# Patient Record
Sex: FEMALE | Race: WHITE
[De-identification: names, ages, dates, MRNs, and addresses within clinical notes are randomized per-mention and may not be internally consistent; named-entity substitution may affect disease eponyms.]

---

## 2018-01-06 ENCOUNTER — HOSPITAL ENCOUNTER (INPATIENT)
Dept: HOSPITAL 96 - M.ERS | Age: 83
LOS: 1 days | Discharge: HOME | DRG: 291 | End: 2018-01-07
Attending: INTERNAL MEDICINE | Admitting: INTERNAL MEDICINE
Payer: MEDICARE

## 2018-01-06 VITALS — SYSTOLIC BLOOD PRESSURE: 169 MMHG | DIASTOLIC BLOOD PRESSURE: 74 MMHG

## 2018-01-06 VITALS — DIASTOLIC BLOOD PRESSURE: 40 MMHG | SYSTOLIC BLOOD PRESSURE: 107 MMHG

## 2018-01-06 VITALS — WEIGHT: 138 LBS | BODY MASS INDEX: 25.08 KG/M2 | HEIGHT: 62.01 IN

## 2018-01-06 VITALS — SYSTOLIC BLOOD PRESSURE: 139 MMHG | DIASTOLIC BLOOD PRESSURE: 58 MMHG

## 2018-01-06 VITALS — DIASTOLIC BLOOD PRESSURE: 65 MMHG | SYSTOLIC BLOOD PRESSURE: 155 MMHG

## 2018-01-06 VITALS — SYSTOLIC BLOOD PRESSURE: 183 MMHG | DIASTOLIC BLOOD PRESSURE: 82 MMHG

## 2018-01-06 VITALS — SYSTOLIC BLOOD PRESSURE: 139 MMHG | DIASTOLIC BLOOD PRESSURE: 44 MMHG

## 2018-01-06 DIAGNOSIS — I42.9: ICD-10-CM

## 2018-01-06 DIAGNOSIS — Z95.0: ICD-10-CM

## 2018-01-06 DIAGNOSIS — N18.3: ICD-10-CM

## 2018-01-06 DIAGNOSIS — J96.20: ICD-10-CM

## 2018-01-06 DIAGNOSIS — I25.2: ICD-10-CM

## 2018-01-06 DIAGNOSIS — Z79.01: ICD-10-CM

## 2018-01-06 DIAGNOSIS — Z90.49: ICD-10-CM

## 2018-01-06 DIAGNOSIS — I50.31: Primary | ICD-10-CM

## 2018-01-06 DIAGNOSIS — I48.91: ICD-10-CM

## 2018-01-06 DIAGNOSIS — Z82.49: ICD-10-CM

## 2018-01-06 DIAGNOSIS — E78.5: ICD-10-CM

## 2018-01-06 DIAGNOSIS — Z79.899: ICD-10-CM

## 2018-01-06 DIAGNOSIS — I12.9: ICD-10-CM

## 2018-01-06 DIAGNOSIS — E03.9: ICD-10-CM

## 2018-01-06 LAB
ABSOLUTE BASOPHILS: 0.1 THOU/UL (ref 0–0.2)
ABSOLUTE EOSINOPHILS: 0.6 THOU/UL (ref 0–0.7)
ABSOLUTE MONOCYTES: 0.7 THOU/UL (ref 0–1.2)
ALBUMIN SERPL-MCNC: 3.6 G/DL (ref 3.4–5)
ALP SERPL-CCNC: 68 U/L (ref 46–116)
ALT SERPL-CCNC: 32 U/L (ref 30–65)
ANION GAP SERPL CALC-SCNC: 8 MMOL/L (ref 7–16)
AST SERPL-CCNC: 27 U/L (ref 15–37)
BASOPHILS NFR BLD AUTO: 1 %
BILIRUB SERPL-MCNC: 0.5 MG/DL
BUN SERPL-MCNC: 18 MG/DL (ref 7–18)
CALCIUM SERPL-MCNC: 8.9 MG/DL (ref 8.5–10.1)
CHLORIDE SERPL-SCNC: 104 MMOL/L (ref 98–107)
CHOLEST SERPL-MCNC: 141 MG/DL (ref ?–200)
CO2 SERPL-SCNC: 28 MMOL/L (ref 21–32)
CREAT SERPL-MCNC: 1.2 MG/DL (ref 0.6–1.3)
EOSINOPHIL NFR BLD: 6.5 %
GLUCOSE SERPL-MCNC: 151 MG/DL (ref 70–99)
GRANULOCYTES NFR BLD MANUAL: 58.8 %
HCT VFR BLD CALC: 35.1 % (ref 37–47)
HDLC SERPL-MCNC: 76 MG/DL (ref 40–?)
HGB BLD-MCNC: 11.7 GM/DL (ref 12–15)
INR PPP: 1.2
LDLC SERPL-MCNC: 55 MG/DL (ref ?–100)
LIPASE: 229 U/L (ref 73–393)
LYMPHOCYTES # BLD: 2.3 THOU/UL (ref 0.8–5.3)
LYMPHOCYTES NFR BLD AUTO: 25.7 %
MCH RBC QN AUTO: 30 PG (ref 26–34)
MCHC RBC AUTO-ENTMCNC: 33.4 G/DL (ref 28–37)
MCV RBC: 89.8 FL (ref 80–100)
MONOCYTES NFR BLD: 8 %
MPV: 9.8 FL. (ref 7.2–11.1)
NEUTROPHILS # BLD: 5.3 THOU/UL (ref 1.6–8.1)
NT-PRO BRAIN NAT PEPTIDE: 7870 PG/ML (ref ?–300)
NUCLEATED RBCS: 0 /100WBC
PLATELET COUNT*: 171 THOU/UL (ref 150–400)
POTASSIUM SERPL-SCNC: 4.4 MMOL/L (ref 3.5–5.1)
PROT SERPL-MCNC: 7 G/DL (ref 6.4–8.2)
PROTHROMBIN TIME: 11.4 SECONDS (ref 9.2–11.5)
RBC # BLD AUTO: 3.91 MIL/UL (ref 4.2–5)
RDW-CV: 15.3 % (ref 10.5–14.5)
SODIUM SERPL-SCNC: 140 MMOL/L (ref 136–145)
TC:HDL: 1.9 RATIO
TRIGL SERPL-MCNC: 54 MG/DL (ref ?–150)
TROPONIN-I LEVEL: <0.06 NG/ML (ref ?–0.06)
VLDLC SERPL CALC-MCNC: 11 MG/DL (ref ?–40)
WBC # BLD AUTO: 9 THOU/UL (ref 4–11)

## 2018-01-06 NOTE — EKG
[place] [address]
Phone:  [phone]                     ELECTROCARDIOGRAM REPORT      
_______________________________________________________________________________
 
Name:       MIKAELA CHAN              Room:           [address]    ADM IN  
M.R.#:  [id number]      Account #:      [id number]  
Admission:  18     Attend Phys:    GABRIELLE Glover
Discharge:               Date of Birth:  [birth year]  
         Report #: [id number]
    [id number]
_______________________________________________________________________________
THIS REPORT FOR:  //name//                      
 
                         [facility] ED
                                       
Test Date:    2018               Test Time:    02:31:45
Pat Name:     MIKAELA CHAN          Department:   
Patient ID:   [id number]            Room:         [facility]
Gender:       F                        Technician:   TO
:          1934               Requested By: Soy Snow
Order Number: [id number] MD:   Tito Patterson
                                 Measurements
Intervals                              Axis          
Rate:         70                       P:            0
UT:           53                       QRS:          27
QRSD:         76                       T:            -85
QT:           395                                    
QTc:          427                                    
                           Interpretive Statements
Ventricular-paced complexes
No further rhythm analysis attempted due to paced rhythm
Borderline low voltage, extremity leads
Repol abnrm suggests ischemia, diffuse leads
Compared to ECG 2017 01:44:57
Early repolarization now present
Possible ischemia now present
 
Electronically Signed On 2018 14:38:31 CST by Tito Patterson
[email or web address]
 
 
 
 
 
 
 
 
 
 
 
 
 
 
 
  <ELECTRONICALLY SIGNED>
                                           By: Tito Patterson MD, FACC   
  18     1438
D: 18 0231   _____________________________________
T: 18 023   Tito Patterson MD, FACC     /EPI

## 2018-01-07 VITALS — DIASTOLIC BLOOD PRESSURE: 68 MMHG | SYSTOLIC BLOOD PRESSURE: 131 MMHG

## 2018-01-07 VITALS — DIASTOLIC BLOOD PRESSURE: 49 MMHG | SYSTOLIC BLOOD PRESSURE: 112 MMHG

## 2018-01-07 VITALS — SYSTOLIC BLOOD PRESSURE: 115 MMHG | DIASTOLIC BLOOD PRESSURE: 50 MMHG

## 2018-01-07 VITALS — SYSTOLIC BLOOD PRESSURE: 131 MMHG | DIASTOLIC BLOOD PRESSURE: 68 MMHG

## 2018-01-07 LAB
ABSOLUTE BASOPHILS: 0.1 THOU/UL (ref 0–0.2)
ABSOLUTE EOSINOPHILS: 0.4 THOU/UL (ref 0–0.7)
ABSOLUTE MONOCYTES: 0.5 THOU/UL (ref 0–1.2)
ANION GAP SERPL CALC-SCNC: 5 MMOL/L (ref 7–16)
BASOPHILS NFR BLD AUTO: 0.9 %
BUN SERPL-MCNC: 21 MG/DL (ref 7–18)
CALCIUM SERPL-MCNC: 8.6 MG/DL (ref 8.5–10.1)
CHLORIDE SERPL-SCNC: 100 MMOL/L (ref 98–107)
CO2 SERPL-SCNC: 34 MMOL/L (ref 21–32)
CREAT SERPL-MCNC: 1.4 MG/DL (ref 0.6–1.3)
EOSINOPHIL NFR BLD: 7.6 %
GLUCOSE SERPL-MCNC: 94 MG/DL (ref 70–99)
GRANULOCYTES NFR BLD MANUAL: 40.3 %
HCT VFR BLD CALC: 30.3 % (ref 37–47)
HGB BLD-MCNC: 10.1 GM/DL (ref 12–15)
LYMPHOCYTES # BLD: 2.3 THOU/UL (ref 0.8–5.3)
LYMPHOCYTES NFR BLD AUTO: 41.8 %
MCH RBC QN AUTO: 29.6 PG (ref 26–34)
MCHC RBC AUTO-ENTMCNC: 33.2 G/DL (ref 28–37)
MCV RBC: 89 FL (ref 80–100)
MONOCYTES NFR BLD: 9.4 %
MPV: 9.5 FL. (ref 7.2–11.1)
NEUTROPHILS # BLD: 2.2 THOU/UL (ref 1.6–8.1)
NUCLEATED RBCS: 0 /100WBC
PLATELET COUNT*: 138 THOU/UL (ref 150–400)
POTASSIUM SERPL-SCNC: 3.1 MMOL/L (ref 3.5–5.1)
RBC # BLD AUTO: 3.41 MIL/UL (ref 4.2–5)
RDW-CV: 14.6 % (ref 10.5–14.5)
SODIUM SERPL-SCNC: 139 MMOL/L (ref 136–145)
WBC # BLD AUTO: 5.6 THOU/UL (ref 4–11)

## 2018-01-09 NOTE — CON
[address]  [address]                    CONSULTATION                  
_______________________________________________________________________________
 
Name:       MIKAELA CHAN              Room:           [address] [place]  
M.R.#:  [id number]      Account #:      [id number]  
Admission:  01/06/18     Attend Phys:    GABRIELLE Glover
Discharge:  01/07/18     Date of Birth:  [birth year]  
         Report #: [id number]
                                                                     [id number]  
_______________________________________________________________________________
THIS REPORT FOR:  //name//                      
 
CC: Myron Joyner
 
INPATIENT CONSULTATION
 
PRIMARY CARDIOLOGIST:  Mj Triplett MD
 
REQUESTING PHYSICIAN FOR CONSULTATION:  Dr. Lachman.
 
PRIMARY CARE PHYSICIAN:  Myron Rodriguez DO
 
REASON FOR CONSULTATION:  Shortness of breath.
 
HISTORY OF PRESENT ILLNESS:  The patient is an 83-year-old woman with a history
of nonischemic cardiomyopathy, Takotsubo etiology and mild to moderate aortic
insufficiency presenting with increasing shortness of breath.  Chest x-ray was
unremarkable but her symptoms did improve with Lasix.
 
She had an episode of chest discomfort.  Her ECG was nondiagnostic.  She has a
history of a permanent pacemaker.  She was admitted for observation and her
troponin levels are normal and she is asymptomatic for shortness of breath after
receiving IV Lasix overnight.
 
She denies syncope or presyncope.
 
Over the summer, she did notice leg swelling but really had only been shortness
of breath recently.
 
She denies noncompliance with other medical therapies for her cardiomyopathy.
 
PAST MEDICAL HISTORY:  She had a cardiac catheterization in November of last
year which demonstrated nonischemic Takotsubo cardiomyopathy.  She had an AV
block permanent pacemaker.  She has mild to moderate aortic insufficiency,
hypertension, and the aforementioned hyperlipidemia.
 
SOCIAL HISTORY:  She is a nonsmoker.
 
FAMILY HISTORY:  Noncontributory.
 
HOME MEDICATIONS:  Atorvastatin 40 mg daily, Celexa daily, digoxin 125 mcg
daily, diltiazem 180 mg daily, Toprol- mg daily, and Xarelto 15 mg daily.
 
REVIEW OF SYSTEMS:
GASTROINTESTINAL:  No nausea, vomiting.
 
 
 
[place] [address]                    CONSULTATION                  
_______________________________________________________________________________
 
Name:       MIKAELA CHAN              Room:           [address]#:  [id number]      Account #:      [id number]  
Admission:  01/06/18     Attend Phys:    GABRIELLE Glover
Discharge:  01/07/18     Date of Birth:  [birth year]  
         Report #: [id number]
                                                                     [id number]  
_______________________________________________________________________________
GENITOURINARY:  No dysuria or hematuria.
PAD:  Denies any history of cardiovascular, strokes, or claudication.
NEUROLOGIC:  Denies headaches or seizures.  She has a remote history of syncope,
but denies syncope with this episode.
GENERAL:  No fevers or chills.
ALLERGIES:  Denies any aspirin or contrast allergies.
PSYCHIATRIC:  No depression or anxiety.
SKIN:  No rashes.
EYES:  she does use glasses.
CARDIOVASCULAR:  As above, positive chest discomfort, positive for shortness of
breath, positive edema intermittent.
 
PHYSICAL EXAMINATION:
VITAL SIGNS:  Blood pressure this morning is 155/65, pulse is 68, weight is 62.7
kilos.
GENERAL:  This is a pleasant elderly female.  She is alert, oriented, in no
apparent distress.
NECK:  Supple.  No jugular venous distention.
CARDIOVASCULAR:  Regular.  There is faint diastolic murmur.
LUNGS:  Clear to auscultation.
ABDOMEN:  Nontender.
EXTREMITIES:  There is no peripheral edema.
 
IMAGING DATA:  Chest x-ray reveals cardiomegaly, no significant infiltrates.
 
LABORATORY DATA:  Cardiac troponin level was 0.06 x 2 sets.
 
IMPRESSION AND PLAN:
1.  Atrial fibrillation.  This is not really an issue.  She presents for stable
heart racing, we will continue with anticoagulation.
2.  Acute diastolic congestive heart failure.  I think she should be on a mild
dose of diuretics.  I will prescribe her Lasix 20 mg with 10 of potassium to be
taken 3 days per week.
3.  Nonischemic cardiomyopathy.  She will continue with aggressive medical
therapy.
4.  Chest pain.  I think this is noncardiac related to ischemia maybe related to
congestive heart failure.
5.  It is okay for her to continue with present medical therapy with addition of
intermittent Lasix.
 
Thank you for allowing me to participate in her care.
 
 
 
<ELECTRONICALLY SIGNED>
                                        By:  Tito Patterson MD, FACC   
01/09/18     1012
D: 01/06/18 1333_______________________________________
T: 01/06/18 2244Tito Patterson MD, FACC      /nt

## 2018-01-17 ENCOUNTER — HOSPITAL ENCOUNTER (INPATIENT)
Dept: HOSPITAL 96 - M.ERS | Age: 83
LOS: 1 days | Discharge: HOME | DRG: 291 | End: 2018-01-18
Attending: INTERNAL MEDICINE | Admitting: INTERNAL MEDICINE
Payer: MEDICARE

## 2018-01-17 VITALS — DIASTOLIC BLOOD PRESSURE: 100 MMHG | SYSTOLIC BLOOD PRESSURE: 187 MMHG

## 2018-01-17 VITALS — DIASTOLIC BLOOD PRESSURE: 78 MMHG | SYSTOLIC BLOOD PRESSURE: 146 MMHG

## 2018-01-17 VITALS — HEIGHT: 62.01 IN | BODY MASS INDEX: 24.35 KG/M2 | WEIGHT: 134 LBS

## 2018-01-17 VITALS — DIASTOLIC BLOOD PRESSURE: 73 MMHG | SYSTOLIC BLOOD PRESSURE: 154 MMHG

## 2018-01-17 VITALS — DIASTOLIC BLOOD PRESSURE: 92 MMHG | SYSTOLIC BLOOD PRESSURE: 200 MMHG

## 2018-01-17 DIAGNOSIS — Z90.49: ICD-10-CM

## 2018-01-17 DIAGNOSIS — E03.9: ICD-10-CM

## 2018-01-17 DIAGNOSIS — Z88.8: ICD-10-CM

## 2018-01-17 DIAGNOSIS — E87.1: ICD-10-CM

## 2018-01-17 DIAGNOSIS — I13.0: Primary | ICD-10-CM

## 2018-01-17 DIAGNOSIS — Z82.49: ICD-10-CM

## 2018-01-17 DIAGNOSIS — Z95.0: ICD-10-CM

## 2018-01-17 DIAGNOSIS — I48.91: ICD-10-CM

## 2018-01-17 DIAGNOSIS — I50.33: ICD-10-CM

## 2018-01-17 DIAGNOSIS — N18.3: ICD-10-CM

## 2018-01-17 DIAGNOSIS — Z79.01: ICD-10-CM

## 2018-01-17 DIAGNOSIS — J90: ICD-10-CM

## 2018-01-17 DIAGNOSIS — Z79.899: ICD-10-CM

## 2018-01-17 LAB
ABSOLUTE BASOPHILS: 0.2 THOU/UL (ref 0–0.2)
ABSOLUTE EOSINOPHILS: 0.4 THOU/UL (ref 0–0.7)
ABSOLUTE MONOCYTES: 0.8 THOU/UL (ref 0–1.2)
ALBUMIN SERPL-MCNC: 3.6 G/DL (ref 3.4–5)
ALP SERPL-CCNC: 71 U/L (ref 46–116)
ALT SERPL-CCNC: 36 U/L (ref 30–65)
ANION GAP SERPL CALC-SCNC: 5 MMOL/L (ref 7–16)
APTT BLD: 24.5 SECONDS (ref 25–31.3)
AST SERPL-CCNC: 36 U/L (ref 15–37)
BASOPHILS NFR BLD AUTO: 2.5 %
BILIRUB SERPL-MCNC: 1.1 MG/DL
BUN SERPL-MCNC: 16 MG/DL (ref 7–18)
CALCIUM SERPL-MCNC: 9.3 MG/DL (ref 8.5–10.1)
CHLORIDE SERPL-SCNC: 98 MMOL/L (ref 98–107)
CK-MB MASS: 0.7 NG/ML
CO2 SERPL-SCNC: 31 MMOL/L (ref 21–32)
CREAT SERPL-MCNC: 1.1 MG/DL (ref 0.6–1.3)
EOSINOPHIL NFR BLD: 4.8 %
GLUCOSE SERPL-MCNC: 118 MG/DL (ref 70–99)
GRANULOCYTES NFR BLD MANUAL: 60.3 %
HCT VFR BLD CALC: 38.3 % (ref 37–47)
HGB BLD-MCNC: 12.7 GM/DL (ref 12–15)
INR PPP: 1.1
LIPASE: 208 U/L (ref 73–393)
LYMPHOCYTES # BLD: 1.6 THOU/UL (ref 0.8–5.3)
LYMPHOCYTES NFR BLD AUTO: 21.2 %
MAGNESIUM SERPL-MCNC: 1.9 MG/DL (ref 1.8–2.4)
MCH RBC QN AUTO: 29.4 PG (ref 26–34)
MCHC RBC AUTO-ENTMCNC: 33.1 G/DL (ref 28–37)
MCV RBC: 88.8 FL (ref 80–100)
MONOCYTES NFR BLD: 11.2 %
MPV: 9.4 FL. (ref 7.2–11.1)
NEUTROPHILS # BLD: 4.5 THOU/UL (ref 1.6–8.1)
NT-PRO BRAIN NAT PEPTIDE: 6146 PG/ML (ref ?–300)
NUCLEATED RBCS: 0 /100WBC
PLATELET COUNT*: 195 THOU/UL (ref 150–400)
POTASSIUM SERPL-SCNC: 4.7 MMOL/L (ref 3.5–5.1)
PROT SERPL-MCNC: 7.4 G/DL (ref 6.4–8.2)
PROTHROMBIN TIME: 10.9 SECONDS (ref 9.2–11.5)
RBC # BLD AUTO: 4.31 MIL/UL (ref 4.2–5)
RDW-CV: 14.8 % (ref 10.5–14.5)
SODIUM SERPL-SCNC: 134 MMOL/L (ref 136–145)
TROPONIN-I LEVEL: <0.06 NG/ML (ref ?–0.06)
WBC # BLD AUTO: 7.4 THOU/UL (ref 4–11)

## 2018-01-17 NOTE — EKG
[place] [address]
Phone:  [phone]                     ELECTROCARDIOGRAM REPORT      
_______________________________________________________________________________
 
Name:       MIKAELA CHAN              Room:           [address]    ADM IN  
M.R.#:  [id number]      Account #:      [id number]  
Admission:  18     Attend Phys:    Bentley Mims MD 
Discharge:               Date of Birth:  [birth year]  
         Report #: [id number]
    [id number]
_______________________________________________________________________________
THIS REPORT FOR:  //name//                      
 
                         [facility] ED
                                       
Test Date:    2018               Test Time:    09:52:04
Pat Name:     MIKAELA CHAN          Department:   
Patient ID:   [id number]            Room:         [facility]
Gender:       F                        Technician:   SASHA DOLL
:          1934               Requested By: Aaron Blackmon
Order Number: [id number] MD:   Mj Triplett
                                 Measurements
Intervals                              Axis          
Rate:         122                      P:            
DE:                                    QRS:          -6
QRSD:         74                       T:            220
QT:           327                                    
QTc:          466                                    
                           Interpretive Statements
Atrial fibrillation
Inferior infarct, age indeterminate
Abnrm T, consider ischemia, anterolateral lds
Compared to ECG 2018 02:31:45
Ventricular premature complex(es) now present
Myocardial infarct finding now present
Early repolarization no longer present
Possible ischemia still present
 
Electronically Signed On 2018 17:41:07 CST by Mj Triplett
[email or web address]
 
 
 
 
 
 
 
 
 
 
 
 
 
 
  <ELECTRONICALLY SIGNED>
                                           By: Mj Triplett MD, FACC   
  18     1741
D: 18 0952   _____________________________________
T: 18 0952   Mj Triplett MD, FACC     /EPI

## 2018-01-17 NOTE — NUR
PT A/OX4, AFIB ON THE MONITOR, RA, UP AD LUIS ALFREDO, FREE FROM SOA/PAIN,
MEDS/ASSESSMENT PER CHARTING, HOURLY ROUNDING IN PLACE, FALL PRECAUTIONS IN
PLACE, BED LOW LOCKED POSITION AND CALL LIGHT IN REACH, VSS, WILL CONT TO
MONITOR.

## 2018-01-17 NOTE — NUR
RECEIVED REPORT. PT TRANSFERRED TO ROOM 224 VIA CART. VSS. CARDIAC MONTIORING
IN PLACE AFIB. ADMISISON HISTORY AND ASSESSMENT COMPLETED AS CHARTED. PT ALERT
AND ORIENTED. PT ON RA HOWEVER PT IS SOB WITH ACTIVITY. 2L ON STAND BY. PT
DENIES ANY COMPLAINTS OF PAIN OR DISCOMFORT. PT IS UP WITH STAND BY
ASSISTANCE. PT ORIENTED TO ROOM AND CALL LIGHT. PT COMMUNICATES UNDERSTANDING.
PT'S BELONINGS IN CARDIAC REHAB CURRENTLY. CALLED AND THEY STATED THEY WOULD
BRING UP HER BELONGINGS TO HER ROOM. PT'S BP ELEVATED-CARDIOLOGY CONSULTED.
PT'S METOPROLOL GIVEN. CALL LIGHT IS WITHIN REACH. WILL CONTINUE TO MONITOR
FOR DURAITON OF SHIFT.

## 2018-01-18 VITALS — DIASTOLIC BLOOD PRESSURE: 59 MMHG | SYSTOLIC BLOOD PRESSURE: 143 MMHG

## 2018-01-18 VITALS — DIASTOLIC BLOOD PRESSURE: 53 MMHG | SYSTOLIC BLOOD PRESSURE: 128 MMHG

## 2018-01-18 VITALS — SYSTOLIC BLOOD PRESSURE: 150 MMHG | DIASTOLIC BLOOD PRESSURE: 73 MMHG

## 2018-01-18 VITALS — DIASTOLIC BLOOD PRESSURE: 73 MMHG | SYSTOLIC BLOOD PRESSURE: 150 MMHG

## 2018-01-18 NOTE — NUR
RECEVIED REPORT. ASSUMED CARE OF PT AT 0730. VSS. CARDIAC MOTNIORING IN PLACE
A-PACED WITH UNDERLYING AFIB. AM ASSESSMENT AND VITALS COMPLETED AS CHARTED.
PT ALERT AND ORIENTED. PT ON RA. PT UP WITH SBA TO BATHROOM THIS AM. PT HAS
NOTED EXERTIONAL DYSPNEA. PT'S O2 SAT 96% ON RA. PT ALERT AND OREINTED. IV
SALINE LOCKED. PT DENIES ANY COMPLAINTS OF PAIN OR DISCOMFORT THIS AM. PT
INFORMED OF PLAN OF CARE. PT COMMUNICATES UNDERSTANDING. CALL LIGHT IS WITHIN
REACH. WILL CONTINUE TO MOTNIOR FOR DURAITON OF SHIFT.

## 2018-01-18 NOTE — NUR
DISCAHRGE ORDERS RECEIVED AND PREPARED. IV AND CARDIAC MONITORING
DISCONTINUED. PT EDUCATED ON DISCHARGE INSTRCUTIONS. PT COMMUNICATES
UNDERSTANDING. PT'S SCRIPT SENT TO PT'S PHARMACY. PT GIVEN COPY OF DISCHARGE
PAPERWORK. PT ESCORTED OFF UNIT VIA W/C. PT LEFT IN PRIVATE VEHICLE.

## 2018-01-18 NOTE — EKG
[place] [address]
Phone:  [phone]                     ELECTROCARDIOGRAM REPORT      
_______________________________________________________________________________
 
Name:       MIKAELA CHAN              Room:           [address]    DIS IN  
M.R.#:  [id number]      Account #:      [id number]  
Admission:  18     Attend Phys:    Bentley Mims MD 
Discharge:  18     Date of Birth:  [birth year]  
         Report #: [id number]
    [id number]
_______________________________________________________________________________
THIS REPORT FOR:  //name//                      
 
                          [facility]
                                       
Test Date:    2018               Test Time:    09:12:36
Pat Name:     MIKAELA CHAN          Department:   
Patient ID:   [id number]            Room:         [address]
Gender:       F                        Technician:   27
:          1934               Requested By: Mj Triplett
Order Number: [id number]    Jacob MD:   John Sorto
                                 Measurements
Intervals                              Axis          
Rate:         104                      P:            
[place]:                                    QRS:          7
QRSD:         74                       T:            244
QT:           336                                    
QTc:          442                                    
                           Interpretive Statements
Afib/flut and V-paced complexes
No further rhythm analysis attempted due to paced rhythm
Repol abnrm, possible ischemia
Compared to ECG 2018 02:31:45
No significant changes
 
Electronically Signed On 2018 15:43:41 CST by John Sorto
[email or web address]
 
 
 
 
 
 
 
 
 
 
 
 
 
 
 
 
 
  <ELECTRONICALLY SIGNED>
                                           By: John Sorto MD, [facility]     
  18     1543
D: 18 0912   _____________________________________
T: 18 0912   John Sorto MD, [facility]       /EPI

## 2018-02-03 ENCOUNTER — HOSPITAL ENCOUNTER (INPATIENT)
Dept: HOSPITAL 96 - M.ERS | Age: 83
LOS: 5 days | Discharge: NURSING, REHAB OR LONG-TERM CARE | DRG: 91 | End: 2018-02-08
Attending: FAMILY MEDICINE | Admitting: FAMILY MEDICINE
Payer: MEDICARE

## 2018-02-03 VITALS — SYSTOLIC BLOOD PRESSURE: 132 MMHG | DIASTOLIC BLOOD PRESSURE: 61 MMHG

## 2018-02-03 VITALS — WEIGHT: 130 LBS | HEIGHT: 65 IN | BODY MASS INDEX: 21.66 KG/M2

## 2018-02-03 VITALS — SYSTOLIC BLOOD PRESSURE: 147 MMHG | DIASTOLIC BLOOD PRESSURE: 84 MMHG

## 2018-02-03 VITALS — SYSTOLIC BLOOD PRESSURE: 158 MMHG | DIASTOLIC BLOOD PRESSURE: 80 MMHG

## 2018-02-03 DIAGNOSIS — G92: Primary | ICD-10-CM

## 2018-02-03 DIAGNOSIS — Z79.899: ICD-10-CM

## 2018-02-03 DIAGNOSIS — Z90.49: ICD-10-CM

## 2018-02-03 DIAGNOSIS — I50.33: ICD-10-CM

## 2018-02-03 DIAGNOSIS — E03.9: ICD-10-CM

## 2018-02-03 DIAGNOSIS — I50.9: ICD-10-CM

## 2018-02-03 DIAGNOSIS — Z88.8: ICD-10-CM

## 2018-02-03 DIAGNOSIS — F03.90: ICD-10-CM

## 2018-02-03 DIAGNOSIS — R65.10: ICD-10-CM

## 2018-02-03 DIAGNOSIS — Z95.0: ICD-10-CM

## 2018-02-03 DIAGNOSIS — I48.91: ICD-10-CM

## 2018-02-03 DIAGNOSIS — I11.0: ICD-10-CM

## 2018-02-03 DIAGNOSIS — N39.0: ICD-10-CM

## 2018-02-03 LAB
ABSOLUTE BASOPHILS: 0.1 THOU/UL (ref 0–0.2)
ABSOLUTE EOSINOPHILS: 0.2 THOU/UL (ref 0–0.7)
ABSOLUTE MONOCYTES: 0.6 THOU/UL (ref 0–1.2)
ALBUMIN SERPL-MCNC: 3.6 G/DL (ref 3.4–5)
ALP SERPL-CCNC: 60 U/L (ref 46–116)
ALT SERPL-CCNC: 26 U/L (ref 30–65)
ANION GAP SERPL CALC-SCNC: 8 MMOL/L (ref 7–16)
APTT BLD: 27.1 SECONDS (ref 25–31.3)
AST SERPL-CCNC: 24 U/L (ref 15–37)
BASOPHILS NFR BLD AUTO: 1.2 %
BE: -0.4 MMOL/L
BILIRUB SERPL-MCNC: 0.8 MG/DL
BILIRUB UR-MCNC: NEGATIVE MG/DL
BUN SERPL-MCNC: 20 MG/DL (ref 7–18)
CALCIUM SERPL-MCNC: 9.2 MG/DL (ref 8.5–10.1)
CHLORIDE SERPL-SCNC: 101 MMOL/L (ref 98–107)
CO2 SERPL-SCNC: 29 MMOL/L (ref 21–32)
COLOR UR: YELLOW
CREAT SERPL-MCNC: 1.3 MG/DL (ref 0.6–1.3)
EOSINOPHIL NFR BLD: 2.7 %
GLUCOSE SERPL-MCNC: 133 MG/DL (ref 70–99)
GRANULOCYTES NFR BLD MANUAL: 67.3 %
HCO3 BLD-SCNC: 23.4 MMOL/L (ref 22–26)
HCT VFR BLD CALC: 36.6 % (ref 37–47)
HGB BLD-MCNC: 12.1 GM/DL (ref 12–15)
INR PPP: 1.2
KETONES UR STRIP-MCNC: (no result) MG/DL
LYMPHOCYTES # BLD: 1.4 THOU/UL (ref 0.8–5.3)
LYMPHOCYTES NFR BLD AUTO: 20.3 %
MCH RBC QN AUTO: 29.2 PG (ref 26–34)
MCHC RBC AUTO-ENTMCNC: 33 G/DL (ref 28–37)
MCV RBC: 88.6 FL (ref 80–100)
MONOCYTES NFR BLD: 8.5 %
MPV: 9.3 FL. (ref 7.2–11.1)
NEUTROPHILS # BLD: 4.6 THOU/UL (ref 1.6–8.1)
NUCLEATED RBCS: 0 /100WBC
PCO2 BLD: 35.5 MMHG (ref 35–45)
PLATELET COUNT*: 187 THOU/UL (ref 150–400)
PO2 BLD: 78.4 MMHG (ref 75–100)
POTASSIUM SERPL-SCNC: 4.4 MMOL/L (ref 3.5–5.1)
PROT SERPL-MCNC: 7 G/DL (ref 6.4–8.2)
PROT UR QL STRIP: (no result)
PROTHROMBIN TIME: 11.6 SECONDS (ref 9.2–11.5)
RBC # BLD AUTO: 4.13 MIL/UL (ref 4.2–5)
RBC # UR STRIP: (no result) /UL
RDW-CV: 14.4 % (ref 10.5–14.5)
SODIUM SERPL-SCNC: 138 MMOL/L (ref 136–145)
SP GR UR STRIP: 1.02 (ref 1–1.03)
SQUAMOUS: (no result) /LPF (ref 0–3)
TROPONIN-I LEVEL: <0.06 NG/ML (ref ?–0.06)
URINE CLARITY: CLEAR
URINE GLUCOSE-RANDOM: NEGATIVE
URINE LEUKOCYTES-REFLEX: (no result)
URINE NITRITE-REFLEX: NEGATIVE
URINE WBC-REFLEX: (no result) /HPF (ref 0–5)
UROBILINOGEN UR STRIP-ACNC: 0.2 E.U./DL (ref 0.2–1)
WBC # BLD AUTO: 6.9 THOU/UL (ref 4–11)

## 2018-02-04 VITALS — DIASTOLIC BLOOD PRESSURE: 61 MMHG | SYSTOLIC BLOOD PRESSURE: 138 MMHG

## 2018-02-04 VITALS — DIASTOLIC BLOOD PRESSURE: 63 MMHG | SYSTOLIC BLOOD PRESSURE: 126 MMHG

## 2018-02-04 VITALS — SYSTOLIC BLOOD PRESSURE: 136 MMHG | DIASTOLIC BLOOD PRESSURE: 76 MMHG

## 2018-02-04 VITALS — DIASTOLIC BLOOD PRESSURE: 64 MMHG | SYSTOLIC BLOOD PRESSURE: 141 MMHG

## 2018-02-04 VITALS — SYSTOLIC BLOOD PRESSURE: 144 MMHG | DIASTOLIC BLOOD PRESSURE: 77 MMHG

## 2018-02-04 NOTE — EKG
[place] [address]
Phone:  [phone]                     ELECTROCARDIOGRAM REPORT      
_______________________________________________________________________________
 
Name:       MIKAELA CHAN              Room:           [address]    ADM IN  
M.R.#:  [id number]      Account #:      [id number]  
Admission:  18     Attend Phys:    Preston Treadwell, 
Discharge:               Date of Birth:  [birth year]  
         Report #: [id number]
    [id number]
_______________________________________________________________________________
THIS REPORT FOR:  //name//                      
 
                         [facility] ED
                                       
Test Date:    2018               Test Time:    21:03:07
Pat Name:     MIKAELA CHAN          Department:   
Patient ID:   [id number]            Room:         [facility]
Gender:       F                        Technician:   TV
:          1934               Requested By: Lisa Pack
Order Number: [id number] MD:   Tito Patterson
                                 Measurements
Intervals                              Axis          
Rate:         72                       P:            
DE:                                    QRS:          -2
QRSD:         84                       T:            -69
QT:           392                                    
QTc:          430                                    
                           Interpretive Statements
Afib/flut and V-paced complexes
No further rhythm analysis attempted due to paced rhythm
Probable LVH with secondary repol abnrm
Compared to ECG 2018 09:52:04
Myocardial infarct finding no longer present
Possible ischemia no longer present
 
Electronically Signed On 2018 11:49:29 CST by Tito Patterson
[email or web address]
 
 
 
 
 
 
 
 
 
 
 
 
 
 
 
 
  <ELECTRONICALLY SIGNED>
                                           By: Tito Patterson MD, FACC   
  18     1149
D: 18   _____________________________________
T: 18   Tito Patterson MD, FAC     /EPI

## 2018-02-04 NOTE — NUR
PT WAS HOPEFUL TO GO HOME TODAY,HER SON AND DIL HERE MOST OF THE DAY,
EXPRESSED CONCERN ABOUT PT GOING HOME AND BEING WEAK, THEY WANT PT TO HAVE
SOME THERAPY IS POSSIBLE. PT SPOKE TO DR DOWILNG AND IS WILLING TO STAY AND
HAVE MORE LABS DRAWN AND PT/OT TOMORROW. FALL PRECATUINS IN PLACE, PT UNSTEADY
ON FEET. VSS, AFIB/VPACED ON THE MONITOR. WILL CONTINUE TO MONITOR.

## 2018-02-04 NOTE — NUR
PATIENT ADMITTED TO THE FLOOR FOR SOA, DIZZINESS, AND NEAR SYCOPE. ALERT AND
ORIENTED X 4. CONT. A-FIB ON MONITOR. DENIES PAIN OR DISCOMFORT. NO SIGN OF
DISTRESS WILL PROCEED WITH CURRENT PLAN OF CARE IN PLACE.

## 2018-02-04 NOTE — NUR
ASSUMED PT CARE AT 0730. FULL ASSESMENT DONE AS CHARTED. PT A/O X4, DENIES
PAIN, VSS, AFIB/VPACED ON THE MONITOR. PT HOPEFUL TO GO HOME TODAY, PT
EDUCATED THAT THE NEUROLOGIST IS COMING TO SEE HER TODAY. PT AGREEABLE TO
THIS. PTS HOME MEDS RESTARTED, NS INFUSING TO LEFT AC AT 100MLS/HR. FALL
PRECATUIOINS IN PLACE, CALL LIGHT IN REACH. WILL CONTINUE WITH PLAN OF CARE.

## 2018-02-05 VITALS — DIASTOLIC BLOOD PRESSURE: 62 MMHG | SYSTOLIC BLOOD PRESSURE: 148 MMHG

## 2018-02-05 VITALS — DIASTOLIC BLOOD PRESSURE: 65 MMHG | SYSTOLIC BLOOD PRESSURE: 158 MMHG

## 2018-02-05 VITALS — SYSTOLIC BLOOD PRESSURE: 140 MMHG | DIASTOLIC BLOOD PRESSURE: 62 MMHG

## 2018-02-05 VITALS — SYSTOLIC BLOOD PRESSURE: 136 MMHG | DIASTOLIC BLOOD PRESSURE: 67 MMHG

## 2018-02-05 VITALS — SYSTOLIC BLOOD PRESSURE: 172 MMHG | DIASTOLIC BLOOD PRESSURE: 92 MMHG

## 2018-02-05 VITALS — DIASTOLIC BLOOD PRESSURE: 74 MMHG | SYSTOLIC BLOOD PRESSURE: 134 MMHG

## 2018-02-05 VITALS — DIASTOLIC BLOOD PRESSURE: 68 MMHG | SYSTOLIC BLOOD PRESSURE: 150 MMHG

## 2018-02-05 VITALS — SYSTOLIC BLOOD PRESSURE: 149 MMHG | DIASTOLIC BLOOD PRESSURE: 67 MMHG

## 2018-02-05 VITALS — DIASTOLIC BLOOD PRESSURE: 62 MMHG | SYSTOLIC BLOOD PRESSURE: 140 MMHG

## 2018-02-05 VITALS — DIASTOLIC BLOOD PRESSURE: 58 MMHG | SYSTOLIC BLOOD PRESSURE: 144 MMHG

## 2018-02-05 VITALS — DIASTOLIC BLOOD PRESSURE: 69 MMHG | SYSTOLIC BLOOD PRESSURE: 153 MMHG

## 2018-02-05 VITALS — DIASTOLIC BLOOD PRESSURE: 73 MMHG | SYSTOLIC BLOOD PRESSURE: 136 MMHG

## 2018-02-05 LAB
ANION GAP SERPL CALC-SCNC: 9 MMOL/L (ref 7–16)
BUN SERPL-MCNC: 18 MG/DL (ref 7–18)
CALCIUM SERPL-MCNC: 8.2 MG/DL (ref 8.5–10.1)
CHLORIDE SERPL-SCNC: 104 MMOL/L (ref 98–107)
CO2 SERPL-SCNC: 25 MMOL/L (ref 21–32)
CREAT SERPL-MCNC: 1.1 MG/DL (ref 0.6–1.3)
GLUCOSE SERPL-MCNC: 116 MG/DL (ref 70–99)
HCT VFR BLD CALC: 30.8 % (ref 37–47)
HGB BLD-MCNC: 10.5 GM/DL (ref 12–15)
MAGNESIUM SERPL-MCNC: 1.5 MG/DL (ref 1.8–2.4)
MCH RBC QN AUTO: 29.9 PG (ref 26–34)
MCHC RBC AUTO-ENTMCNC: 34.1 G/DL (ref 28–37)
MCV RBC: 87.5 FL (ref 80–100)
MPV: 9.5 FL. (ref 7.2–11.1)
PLATELET COUNT*: 159 THOU/UL (ref 150–400)
POTASSIUM SERPL-SCNC: 4 MMOL/L (ref 3.5–5.1)
RBC # BLD AUTO: 3.52 MIL/UL (ref 4.2–5)
RDW-CV: 14.5 % (ref 10.5–14.5)
SODIUM SERPL-SCNC: 138 MMOL/L (ref 136–145)
WBC # BLD AUTO: 6.3 THOU/UL (ref 4–11)

## 2018-02-05 NOTE — NUR
VSS, PT IS PROGROGRESSING TOWARDS GOAL, PT IS TRAACING V-PACED ON THE MONITOR
AND UP WITH ONE AND IS CONFUSED, AND O2 NEEDS ARE RA, PT DENIES ANY PAIN AND
HAS WORKED WITH PT/OT/SP,, PT HAS BEEN UP TO CHAIR FOR ALL MEALS AND DENIES
ANY PAIN AT THIS TIME HOURLY ROUNDS COMPLETED.

## 2018-02-05 NOTE — NUR
THIS NURSE ASSUMES CARE OF PT 02/04/18 AT 1930, PT IS ALERT AND ORIENTED X4,
PT RESTS QUIETLY WITH EYES CLOSED MOST OF THIS SHIFT, PT TRACING A FIB VPACED
ON CARDIAC MONITORING, PT PLACED ON 2L SUPPLEMENTAL O2 THIS AM FOR O2 SAT IN
LOW 90S AND INCREASED WORK OF BREATHING, PT UP TO BATHROOM WITH ASSIST
THROUGHOUT THE NIGHT

## 2018-02-05 NOTE — NUR
MET WITH PT AND THEN SPOKE WITH DTR/YAN ALONSO OVER THE PHONE.  ALSO MET
WITH SON/VERO IN WAITING ROOM.  PT LIVES ALONE.  KNOWN TO CM FROM ADMIT IN NOV
WHEN SHE WENT TO STAY WITH DIGNA/LIO IN [place] WITH [facility].  PT ADMITTED
WITH AMS.  PT WAS CONFUSED DURING CONVERSATION, UNABLE TO ANSWER ALL QUESTIONS
AND BECAME FRUSTRATED.  PER LIO, PT HAS DEMENTIA AND HAS BEEN LIVING ALONE
SINCE SHE LEFT HER HOUSE IN NOV.  PT HAD HH WHILE SHE WAS AT [facility] BUT
DISCONTINUED THEM BEFORE RETURNING HOME.  PT HAS BEEN DRIVING SOME.  LIO AND
AND PT'S SON/VERO CHECK ON PT REGULARLY/AT LEAST EVERY WEEKEND.  LIO DOES
SHOPPING, MAKES UP MEALS THAT PT CAN HEAT UP AND DOES CLEANING AND LAUNDRY.
VERO STATED THAT HE COMES UP WITH HIS WIFE FROM [place], [place] AND THEY
STAY WITH PT PERIODICALLY. HE AND HIS WIFE LIVED WITH PT FOR A YEAR A FEW
YEARS AGO.  HE REPORTS SEEING A 'STEADY DECLINE' IN HER MENTAL CAPACITY. BOTH
CHILDREN VOICED CONCERN ABOUT PT BEING ALONE BUT LIO DOESN'T WANT TO 'PUSH'
PT TO MOVE.  BOTH STATED THEY FEEL PT SHOULD BE IN AN ASSISTED LIVING
SITUATION AND ONE HAD BEEN ARRANGED ABOUT A YEAR AGO IN [place], BUT PT
DECLINED.  VERO STATED THAT PT 'PUT HERSELF' ON THE WAITING LIST AT THE
[facility] A YEAR AGO AND HE SPOKE WITH THEM TODAY AND THERE MAY BE AN APT IN
ASSISTED LIVING SOON.  AT THIS TIME, FAMILY IS WILLING FOR PT TO GO HOME WITH
24HR FAMILY CARE AND HH AND CONSIDER group home AFTER THAT.  SHE HAS HAD [facility]
IN PAST AND ARE INTERESTED IN IT.  VERO'S WIFE CAN STAY WITH PT FOR NOW.
CALLED AND FAXED INITIAL REFERRAL TO [facility].  WILL FOLLOW

## 2018-02-06 VITALS — DIASTOLIC BLOOD PRESSURE: 64 MMHG | SYSTOLIC BLOOD PRESSURE: 143 MMHG

## 2018-02-06 VITALS — SYSTOLIC BLOOD PRESSURE: 143 MMHG | DIASTOLIC BLOOD PRESSURE: 64 MMHG

## 2018-02-06 VITALS — SYSTOLIC BLOOD PRESSURE: 146 MMHG | DIASTOLIC BLOOD PRESSURE: 60 MMHG

## 2018-02-06 VITALS — DIASTOLIC BLOOD PRESSURE: 51 MMHG | SYSTOLIC BLOOD PRESSURE: 151 MMHG

## 2018-02-06 VITALS — DIASTOLIC BLOOD PRESSURE: 60 MMHG | SYSTOLIC BLOOD PRESSURE: 146 MMHG

## 2018-02-06 VITALS — DIASTOLIC BLOOD PRESSURE: 63 MMHG | SYSTOLIC BLOOD PRESSURE: 129 MMHG

## 2018-02-06 VITALS — SYSTOLIC BLOOD PRESSURE: 138 MMHG | DIASTOLIC BLOOD PRESSURE: 59 MMHG

## 2018-02-06 VITALS — DIASTOLIC BLOOD PRESSURE: 72 MMHG | SYSTOLIC BLOOD PRESSURE: 163 MMHG

## 2018-02-06 VITALS — DIASTOLIC BLOOD PRESSURE: 59 MMHG | SYSTOLIC BLOOD PRESSURE: 138 MMHG

## 2018-02-06 LAB
BILIRUB UR-MCNC: NEGATIVE MG/DL
COLOR UR: YELLOW
KETONES UR STRIP-MCNC: NEGATIVE MG/DL
PROT UR QL STRIP: NEGATIVE
RBC # UR STRIP: NEGATIVE /UL
SP GR UR STRIP: >= 1.03 (ref 1–1.03)
URINE CLARITY: CLEAR
URINE GLUCOSE-RANDOM: NEGATIVE
URINE LEUKOCYTES-REFLEX: NEGATIVE
URINE NITRITE-REFLEX: NEGATIVE
UROBILINOGEN UR STRIP-ACNC: 0.2 E.U./DL (ref 0.2–1)

## 2018-02-06 NOTE — NUR
ASSUMED PT CARE AT 1930, PT IS AWAKE AND ALERT TO SELF AND PLACE. PT IS
TRACING VPACED ONT HE MONITOR, ON RA SATTING MID TO HIGH 90'S. PT HAS HAD
EPISODES OF CONFUSION THROUGHOUT THE NIGHT. NEURO CHECKS COMPLETED AS
CHARTED. IVF INFUSING PER MAR. PT DENIES ANY PAIN OR NEEDS AT THIS TIME. PT
DOES NOT CALL OUT CORRECTLY, PT SET OFF BED ALARM, AND TRIED TO UNPLUG THE BED
FROM THE WALL TO STOP THE ALARM FROM GOING OFF, PT WAS GIVEN LOT'S OF VERBAL
EDUCATION ON NEED TO CALL OUT FOR NEEDS. BED IN LOW POSITION, CALL LIGHT IN
REACH, BED ALARM ON, YELLOW ARM BAND AND SOCKS IN PLACE. HOURLY ROUNDING
COMPLETED FOR PT SAFETY.

## 2018-02-06 NOTE — NUR
ASSUMED CARE OF PT THIS AM AROUND 0715- CARDIAC MONITOR IN PLACE AS ORDERED,
TRACING V-PACED- UPON ASSESSMENT PT NOTED TO BE RESTING IN BED- PT NOTED TO BE
CONFUSED, WITH WORDS NOT MAKING SINCE THIS AM- WHILE ADMINISTERING MEDICAITONS
PT NOTED TO BE PICKING AS GOWN AND COVERS STATING THAT THERE WAS A PILL THAT
WASN'T THERE- TRYING YO  MEDICATIONS IN HAND THAT WERE NOT THERE- WHEN
TRYING TO REDIRECT, PT NOTED TO BECOME AGGITATED- CONTINENT OF BOWEL AND
BLADDER- ASSIST X1 WITH TRANSFERS- LCTA, DIMINISHED IN BASES- VSS, 02 SATS 98%
ON RA- IV NOTED TO RIGHT FOREARM INTACT, IVF INFUSING AS PRESCIBED- IV ABT
GIVEN THIS AM, NO ADVERSE REACTIONS TO NOTE- ABDOMEN SOFT/ROUND/NON-TENDER, BS
X4 QUADS- PT REPORTS LAST BM 2/5/18, R/T COGNITION UNABLE TO KNOW AT THIS
TIME- TRACE EDEME NOTED TO BLE- NEURO CHECKS IN PLACE AS INDICATED- PT DENIES
ANY C/O PAIN/DISCOMFORT AT THIS TIME- GOOD PO INTAKE NOTED WITH BREAKFAST-
CALL LIGHT AND PERSONAL BELONGINGS WITH IN REACH- BED ALARM INPLACE AND
WORKING FOR PT SAFETY- HOURLY ROUNDS IN PLACE R/T SAFETY/NEEDS- ALL NEEDS MET
AT THIS TIME-WCTM

## 2018-02-06 NOTE — NUR
PT ELMO RESTING IN BED, SON AND DAUGHTER IN LAW AT SIDE MOST SHIFT AND
ACTIVE IN CARES- CARDIAC MONITOR CONTINUED AS ORDERED, V-PACED WITH NOTED PACE
MAKER- IV TO RIGHT FA INTACT WITH IVF INFUING AS ORDERED- LEVAQUIN D/C'D
THIS SHIFT WITH BACTRIM BID STARTED- NEW CLEAN CATCH US SENT TO LAB THIS SHIFT
AS ORDERED- BLOOD CULTURES NEGATIVE- POOR PO INTAKE NOTED THIS SHIFT WITH
MEALS-PT NOTED TO BE SLEEPY THIS SHIFT- COGNITION SOME WHAT IMPROVED
THROUGHOUT SHIFT-NEURO CONSULTED R/T HALLUCINATIONS; MRI AND EEG ORDERED PER
- PT DENIES ANY C/O PAIN/DISCOMFORT AT THIS TIME- CALL LIGHT
AND PERSONAL BELONGINGS WITH IN REACH- HOURLY ROUNDS IN PLACE- ALL NEEDS MET
AT THIS TIME- WCTM

## 2018-02-06 NOTE — NUR
CONTINUE TO FOLLOW, SPOKE WITH DTR/LIO AND WITH SON/VERO.  PLAN IS STILL FOR
PT TO RETURN HOME WITH 24HR FAMILY CARE AND HH WITH [facility]. THE FAMILY IS
WORKING ON AN BRITTNY FOR PT.  PT CONFUSED TODAY AND NEURO TO RE-EVAL.  WILL
FOLLOW

## 2018-02-07 VITALS — SYSTOLIC BLOOD PRESSURE: 163 MMHG | DIASTOLIC BLOOD PRESSURE: 76 MMHG

## 2018-02-07 VITALS — SYSTOLIC BLOOD PRESSURE: 145 MMHG | DIASTOLIC BLOOD PRESSURE: 49 MMHG

## 2018-02-07 VITALS — SYSTOLIC BLOOD PRESSURE: 149 MMHG | DIASTOLIC BLOOD PRESSURE: 64 MMHG

## 2018-02-07 VITALS — DIASTOLIC BLOOD PRESSURE: 58 MMHG | SYSTOLIC BLOOD PRESSURE: 128 MMHG

## 2018-02-07 VITALS — DIASTOLIC BLOOD PRESSURE: 65 MMHG | SYSTOLIC BLOOD PRESSURE: 146 MMHG

## 2018-02-07 VITALS — SYSTOLIC BLOOD PRESSURE: 159 MMHG | DIASTOLIC BLOOD PRESSURE: 77 MMHG

## 2018-02-07 LAB
ABSOLUTE BASOPHILS: 0.1 THOU/UL (ref 0–0.2)
ABSOLUTE EOSINOPHILS: 0.3 THOU/UL (ref 0–0.7)
ABSOLUTE MONOCYTES: 0.5 THOU/UL (ref 0–1.2)
ALBUMIN SERPL-MCNC: 3.3 G/DL (ref 3.4–5)
ALP SERPL-CCNC: 55 U/L (ref 46–116)
ALT SERPL-CCNC: 30 U/L (ref 30–65)
ANION GAP SERPL CALC-SCNC: 10 MMOL/L (ref 7–16)
AST SERPL-CCNC: 33 U/L (ref 15–37)
BASOPHILS NFR BLD AUTO: 1.2 %
BILIRUB SERPL-MCNC: 0.7 MG/DL
BUN SERPL-MCNC: 13 MG/DL (ref 7–18)
CALCIUM SERPL-MCNC: 8.1 MG/DL (ref 8.5–10.1)
CHLORIDE SERPL-SCNC: 104 MMOL/L (ref 98–107)
CO2 SERPL-SCNC: 22 MMOL/L (ref 21–32)
CREAT SERPL-MCNC: 0.9 MG/DL (ref 0.6–1.3)
EOSINOPHIL NFR BLD: 4.9 %
GLUCOSE SERPL-MCNC: 122 MG/DL (ref 70–99)
GRANULOCYTES NFR BLD MANUAL: 61.4 %
HCT VFR BLD CALC: 34.6 % (ref 37–47)
HGB BLD-MCNC: 11.3 GM/DL (ref 12–15)
LYMPHOCYTES # BLD: 1.4 THOU/UL (ref 0.8–5.3)
LYMPHOCYTES NFR BLD AUTO: 23.3 %
MCH RBC QN AUTO: 29.2 PG (ref 26–34)
MCHC RBC AUTO-ENTMCNC: 32.6 G/DL (ref 28–37)
MCV RBC: 89.6 FL (ref 80–100)
MONOCYTES NFR BLD: 9.2 %
MPV: 9.4 FL. (ref 7.2–11.1)
NEUTROPHILS # BLD: 3.6 THOU/UL (ref 1.6–8.1)
NT-PRO BRAIN NAT PEPTIDE: 4905 PG/ML (ref ?–300)
NUCLEATED RBCS: 0 /100WBC
PLATELET COUNT*: 158 THOU/UL (ref 150–400)
POTASSIUM SERPL-SCNC: 4.8 MMOL/L (ref 3.5–5.1)
PROT SERPL-MCNC: 6.2 G/DL (ref 6.4–8.2)
RBC # BLD AUTO: 3.87 MIL/UL (ref 4.2–5)
RDW-CV: 14.7 % (ref 10.5–14.5)
SODIUM SERPL-SCNC: 136 MMOL/L (ref 136–145)
TROPONIN-I LEVEL: <0.06 NG/ML (ref ?–0.06)
WBC # BLD AUTO: 5.9 THOU/UL (ref 4–11)

## 2018-02-07 NOTE — EKG
[place] [address]
Phone:  [phone]                     ELECTROCARDIOGRAM REPORT      
_______________________________________________________________________________
 
Name:       MIKAELA CHAN              Room:           [address]    ADM IN  
M.R.#:  [id number]      Account #:      [id number]  
Admission:  18     Attend Phys:    Preston Treadwell, 
Discharge:               Date of Birth:  [birth year]  
         Report #: [id number]
    [id number]
_______________________________________________________________________________
THIS REPORT FOR:  //name//                      
 
                          [facility]
                                       
Test Date:    2018               Test Time:    09:55:47
Pat Name:     MIKAELA CHAN          Department:   
Patient ID:   [id number]            Room:         [address]
Gender:       F                        Technician:   
:          1934               Requested By: Ryan Weathers
Order Number: [id number]    Jacob MD:   Myron Nath
                                 Measurements
Intervals                              Axis          
Rate:         66                       P:            0
IN:           68                       QRS:          267
QRSD:         148                      T:            48
QT:           440                                    
QTc:          461                                    
                           Interpretive Statements
Ventricular-paced complexes
atrial fibrillation
fusion beats noted
nonspecific st changes
No further analysis attempted due to paced rhythm
Compared to ECG 2018 21:03:07
no change
 
Electronically Signed On 2018 12:50:55 CST by Myron Nath
[email or web address]
 
 
 
 
 
 
 
 
 
 
 
 
 
 
 
  <ELECTRONICALLY SIGNED>
                                           By: Myron Nath MD, [facility]      
  18     1250
D: 18 0955   _____________________________________
T: 18 0955   Myron Nath MD, [facility]        /EPI

## 2018-02-07 NOTE — NUR
ASSUMED CARE OF PT THIS AM AROUND 0715- CARDIAC MONITOR IN PLACE AS ORDERED,
V-PACED- UPON ASSESSMENT PT NOTED TO BE RESTING IN BED EYES CLOSED-PT A&O X2
WITH NOTED INTERMEDIATE CONFUSION- CONTINENT OF BOWEL AND BLADDER- ASSIST X-1
WITH TRANSFERS- LCTA, DIMINSHED IN BASES- NON-PRODUCTIVE COUGH NOTED- LABORED
BREATHING NOTED, PT REPORTS THAT SHE FEELS THAT SHE JUST CAN'T CATCH BREATH-
VSS, O2 98% ON 2L VIA NC- ABDOOMEN SOFT/ROUND/NON-TENDER, BS X4 QUADS-TRACE
EDEMA NOTED TO BLE-  NOTIFIED OF PT C/O AND ASSESSMENT FINDINGS-
NEW ORDERS: CBC, CMP, BNP, TROP, CHEST X-RAY, AND EKG NOTED- IV NOTED TO RIGHT
FA INTACT WITH COBAN, IVF INFUSING AS ORDERED- PO BACTRIM GIVEN AS PRESCIBED,
NO ADVERSE REACTIONS TO NOTE- CALL LIGHT AND PERSONAL BELONGINGS WITH IN
REACH- BED ALARM IN PLACE AND WORKING FOR PT SAFETY- HOURLY ROUNDS IN PLACE
R/T SAFETY/NEEDS- ALL NEEDS MET AT THIS TIME-WCTM

## 2018-02-07 NOTE — NUR
Assumed patient care at 1900.  Patient alert to self, confused.  Son at
bedside.  Nursing assessment completed as documented.  Able to ambulate with
STB and her walker.  No complaints of pain or discomfort.  Tolerated PO abt's
well.  No nausea noted.  Patient die "text" her son in her confusion and son
called nurses station concerned "this is the first time my mom has ever
texted"  Reassurance provided and spoke with patient whom admitted to [facility].
Patient very much wants to go home.  No skin issues noted at this time.
Fall risk precautions in place.  Hourly rounding completed as documented.

## 2018-02-07 NOTE — NUR
PT ELDERENLTY RESTING IN BED, SON AT SIDE- CARDIAC MONITOR IN PLACE AS ORDERED,
V-PACED- IV TO RIGHT FA INTACT AND SL- POOR PO INTAKE CONTINUES THIS SHIFT-
BNP NOTED TO BE ELEVATED AT 4905, CHEST X-RAY RESULTS COMMUNICATED TO
- NEW ORDERS NOTED FOR 40MG IV LASIX X1 AND GIVEN- TROPS NORMAL,
EKG NORMAL- PT NOTED TO HAVE IMPROVMENT IN BREATHING POST LASIX ADMINSTRATION
THROUGHOUT SHIFT- DENIES PAIN/DISCOMFORT AT THIS TIME- CALL LIGHT AND PERSONAL
BELONGINGS WITH IN REACH-FREQUENT CHECKS IN PLACE R/T SAFETY/NEEDS- ALL NEEDS
MET AT THIS TIME-WCTM

## 2018-02-08 VITALS — SYSTOLIC BLOOD PRESSURE: 147 MMHG | DIASTOLIC BLOOD PRESSURE: 69 MMHG

## 2018-02-08 VITALS — DIASTOLIC BLOOD PRESSURE: 50 MMHG | SYSTOLIC BLOOD PRESSURE: 151 MMHG

## 2018-02-08 VITALS — SYSTOLIC BLOOD PRESSURE: 143 MMHG | DIASTOLIC BLOOD PRESSURE: 54 MMHG

## 2018-02-08 VITALS — DIASTOLIC BLOOD PRESSURE: 56 MMHG | SYSTOLIC BLOOD PRESSURE: 149 MMHG

## 2018-02-08 NOTE — NUR
PT CARE ASSUMED AFTER REPORT. ASSESSMENT COMPLETE. V PACED ON MONITOR. O2 2L
NC. DENIES PAIN. CONTINUED CONFUSION AT TIMES. CALL LIGHT IN REACH. BED IN
LOWEST POSITION. FALL PRECAUTIONS IN PLACE INCLUDING BED ALARM. PROGRESSING
TOWRDS GOALS.

## 2018-02-08 NOTE — NUR
CONTINUE TO FOLLOW, DISCUSSED WITH DR ARANDA.  RECOMMENDING SNF NOW.  MET
WITH PT AND SON/VERO. PT IS A/O TODAY AND AGREEABLE TO SNF.  FAMILY IS
ARRANGING FOR PT TO GO [facility] AFTER SNF BUT NEED SOME TIME TO GET ALL
ARRANGED.  PT WEAK.  DISCUSSED SNF, FIRST CHOICE IS [facility].  CALL TO
[facility], THEY ARE FULL.  DISCUSSED OTHER OPTIONS. FAMILY IS FAMILIAR WITH
[facility] NOW CALLED [facility].  HAD FAMILY THERE.  CALL TO
[facility], THEY HAVE BEDS AND SHE WILL COME TO EVAL PT TODAY.  FAXED
REFERRAL TO HER.  CALL TO D[facility]NA/LIO TO UPDATE ALSO, HAD TO LEAVE MESSAGE

## 2018-02-09 NOTE — EEG
[address]  [address]                    EEG STUDY REPORT              
_______________________________________________________________________________
 
Name:       MIKAELA CHAN              Room:           [address] [place]  
M.R.#:  [id number]      Account #:      [id number]  
Admission:  02/03/18     Attend Phys:    Preston Treadwell, 
Discharge:  02/08/18     Date of Birth:  [birth year]  
         Report #: [id number]
                                                                     [id number]  
_______________________________________________________________________________
THIS REPORT FOR:  //name//                      
 
CC: Myronlora Rodriguez
    Preston Treadwell
 
DATE OF SERVICE:  02/07/2018
 
 
This patient is being evaluated for altered mental status.  Background activity
in this patient's EEG is about 7-8 Hz and 30 microvolts.  The patient went to
sleep.  That is associated with bilateral slowing and vertex sharp waves. 
Photic stimulation is unremarkable.  Throughout the record, no active
epileptiform activity was noticed.
 
IMPRESSION:  Moderately abnormal electroencephalogram because it is disorganized
and poorly formed.  That is a nonspecific abnormality, which can occur with
encephalopathy, effect of psychotropic medication, dementia, etc.  Clinical
correlation is recommended.
 
Thank you very much for this referral.
 
 
 
 
 
 
 
 
 
 
 
 
 
 
 
 
 
 
 
 
 
 
 
 
<ELECTRONICALLY SIGNED>
                                        By:  Leandro Berg MD             
02/09/18     1039
D: 02/07/18 1523_______________________________________
T: 02/07/18 1542Prebecca Berg MD                /nt

## 2019-01-20 ENCOUNTER — HOSPITAL ENCOUNTER (INPATIENT)
Dept: HOSPITAL 96 - M.ERS | Age: 84
LOS: 2 days | Discharge: HOME HEALTH | DRG: 291 | End: 2019-01-22
Attending: INTERNAL MEDICINE | Admitting: INTERNAL MEDICINE
Payer: MEDICARE

## 2019-01-20 VITALS — SYSTOLIC BLOOD PRESSURE: 124 MMHG | DIASTOLIC BLOOD PRESSURE: 76 MMHG

## 2019-01-20 VITALS — DIASTOLIC BLOOD PRESSURE: 42 MMHG | SYSTOLIC BLOOD PRESSURE: 109 MMHG

## 2019-01-20 VITALS — SYSTOLIC BLOOD PRESSURE: 128 MMHG | DIASTOLIC BLOOD PRESSURE: 49 MMHG

## 2019-01-20 VITALS — SYSTOLIC BLOOD PRESSURE: 167 MMHG | DIASTOLIC BLOOD PRESSURE: 62 MMHG

## 2019-01-20 VITALS — SYSTOLIC BLOOD PRESSURE: 153 MMHG | DIASTOLIC BLOOD PRESSURE: 75 MMHG

## 2019-01-20 VITALS — WEIGHT: 144 LBS | HEIGHT: 62.01 IN | BODY MASS INDEX: 26.17 KG/M2

## 2019-01-20 VITALS — SYSTOLIC BLOOD PRESSURE: 184 MMHG | DIASTOLIC BLOOD PRESSURE: 88 MMHG

## 2019-01-20 VITALS — DIASTOLIC BLOOD PRESSURE: 60 MMHG | SYSTOLIC BLOOD PRESSURE: 160 MMHG

## 2019-01-20 DIAGNOSIS — I27.20: ICD-10-CM

## 2019-01-20 DIAGNOSIS — N18.3: ICD-10-CM

## 2019-01-20 DIAGNOSIS — F32.9: ICD-10-CM

## 2019-01-20 DIAGNOSIS — Z79.899: ICD-10-CM

## 2019-01-20 DIAGNOSIS — F03.90: ICD-10-CM
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
## 2019-01-20 DIAGNOSIS — I13.0: Primary | ICD-10-CM

## 2019-01-20 DIAGNOSIS — I42.9: ICD-10-CM

## 2019-01-20 DIAGNOSIS — Z95.0: ICD-10-CM

## 2019-01-20 DIAGNOSIS — Z82.49: ICD-10-CM

## 2019-01-20 DIAGNOSIS — I48.2: ICD-10-CM

## 2019-01-20 DIAGNOSIS — Z79.82: ICD-10-CM

## 2019-01-20 DIAGNOSIS — Z88.8: ICD-10-CM

## 2019-01-20 DIAGNOSIS — I50.43: ICD-10-CM

## 2019-01-20 DIAGNOSIS — E78.5: ICD-10-CM

## 2019-01-20 DIAGNOSIS — T46.0X5A: ICD-10-CM

## 2019-01-20 DIAGNOSIS — E03.9: ICD-10-CM

## 2019-01-20 DIAGNOSIS — J96.91: ICD-10-CM

## 2019-01-20 DIAGNOSIS — R07.89: ICD-10-CM

## 2019-01-20 LAB
ABSOLUTE BASOPHILS: 0.1 THOU/UL (ref 0–0.2)
ABSOLUTE EOSINOPHILS: 0.3 THOU/UL (ref 0–0.7)
ABSOLUTE MONOCYTES: 0.9 THOU/UL (ref 0–1.2)
ALBUMIN SERPL-MCNC: 3 G/DL (ref 3.4–5)
ALBUMIN SERPL-MCNC: 3.3 G/DL (ref 3.4–5)
ALP SERPL-CCNC: 61 U/L (ref 46–116)
ALP SERPL-CCNC: 67 U/L (ref 46–116)
ALT SERPL-CCNC: 44 U/L (ref 30–65)
ALT SERPL-CCNC: 46 U/L (ref 30–65)
ANION GAP SERPL CALC-SCNC: 10 MMOL/L (ref 7–16)
ANION GAP SERPL CALC-SCNC: 7 MMOL/L (ref 7–16)
AST SERPL-CCNC: 28 U/L (ref 15–37)
AST SERPL-CCNC: 34 U/L (ref 15–37)
BASOPHILS NFR BLD AUTO: 0.8 %
BE: -1.9 MMOL/L
BILIRUB DIRECT SERPL-MCNC: 0.2 MG/DL
BILIRUB SERPL-MCNC: 0.3 MG/DL
BILIRUB SERPL-MCNC: 0.4 MG/DL
BUN SERPL-MCNC: 32 MG/DL (ref 7–18)
BUN SERPL-MCNC: 33 MG/DL (ref 7–18)
CALCIUM SERPL-MCNC: 8.2 MG/DL (ref 8.5–10.1)
CALCIUM SERPL-MCNC: 8.4 MG/DL (ref 8.5–10.1)
CHLORIDE SERPL-SCNC: 104 MMOL/L (ref 98–107)
CHLORIDE SERPL-SCNC: 106 MMOL/L (ref 98–107)
CO2 SERPL-SCNC: 24 MMOL/L (ref 21–32)
CO2 SERPL-SCNC: 27 MMOL/L (ref 21–32)
CREAT SERPL-MCNC: 1.3 MG/DL (ref 0.6–1.3)
CREAT SERPL-MCNC: 1.4 MG/DL (ref 0.6–1.3)
EOSINOPHIL NFR BLD: 2.8 %
GLUCOSE SERPL-MCNC: 154 MG/DL (ref 70–99)
GLUCOSE SERPL-MCNC: 166 MG/DL (ref 70–99)
GRANULOCYTES NFR BLD MANUAL: 65 %
HCO3 BLD-SCNC: 22.7 MMOL/L (ref 22–26)
HCT VFR BLD CALC: 32.5 % (ref 37–47)
HGB BLD-MCNC: 10.5 GM/DL (ref 12–15)
INR PPP: 1.2
LYMPHOCYTES # BLD: 2.6 THOU/UL (ref 0.8–5.3)
LYMPHOCYTES NFR BLD AUTO: 23.4 %
MAGNESIUM SERPL-MCNC: 1.8 MG/DL (ref 1.8–2.4)
MCH RBC QN AUTO: 27.4 PG (ref 26–34)
MCHC RBC AUTO-ENTMCNC: 32.5 G/DL (ref 28–37)
MCV RBC: 84.2 FL (ref 80–100)
MONOCYTES NFR BLD: 8 %
MPV: 10 FL. (ref 7.2–11.1)
NEUTROPHILS # BLD: 7.3 THOU/UL (ref 1.6–8.1)
NT-PRO BRAIN NAT PEPTIDE: 4119 PG/ML (ref ?–300)
NUCLEATED RBCS: 0 /100WBC
PCO2 BLD: 38.1 MMHG (ref 35–45)
PHOSPHATE SERPL-MCNC: 2.8 MG/DL (ref 2.5–4.9)
PLATELET COUNT*: 171 THOU/UL (ref 150–400)
PO2 BLD: 75.3 MMHG (ref 75–100)
POTASSIUM SERPL-SCNC: 4.3 MMOL/L (ref 3.5–5.1)
POTASSIUM SERPL-SCNC: 4.4 MMOL/L (ref 3.5–5.1)
PROT SERPL-MCNC: 6.2 G/DL (ref 6.4–8.2)
PROT SERPL-MCNC: 6.8 G/DL (ref 6.4–8.2)
PROTHROMBIN TIME: 12 SECONDS (ref 9.2–11.5)
RBC # BLD AUTO: 3.86 MIL/UL (ref 4.2–5)
RDW-CV: 15.5 % (ref 10.5–14.5)
SODIUM SERPL-SCNC: 138 MMOL/L (ref 136–145)
SODIUM SERPL-SCNC: 140 MMOL/L (ref 136–145)
TROPONIN-I LEVEL: <0.06 NG/ML (ref ?–0.06)
WBC # BLD AUTO: 11.2 THOU/UL (ref 4–11)

## 2019-01-20 NOTE — EKG
[place] [address]
Phone:  [phone]                     ELECTROCARDIOGRAM REPORT      
_______________________________________________________________________________
 
Name:       MIKAELA CHAN              Room:           [address]    ADM IN  
M.R.#:  [id number]      Account #:      [id number]  
Admission:  19     Attend Phys:    JOHANNY Neff
Discharge:               Date of Birth:  [birth year]  
         Report #: [id number]
    [id number]
_______________________________________________________________________________
THIS REPORT FOR:  //name//                      
 
                          [facility]
                                       
Test Date:    2019               Test Time:    07:53:44
Pat Name:     MIKAELA CHAN          Department:   
Patient ID:   [id number]            Room:         [address]
Gender:       F                        Technician:   
:          1934               Requested By: Lisa Pack
Order Number: [id number]    Reading MD:   Mj Triplett
                                 Measurements
Intervals                              Axis          
Rate:         74                       P:            
WV:                                    QRS:          32
QRSD:         80                       T:            228
QT:           412                                    
QTc:          457                                    
                           Interpretive Statements
Afib/flut and V-paced complexes
No further rhythm analysis attempted due to paced rhythm
Borderline low voltage, extremity leads
Nonspecific repol abnormality, diffuse leads
Compared to ECG 2019 22:50:39
Early repolarization now present
 
Electronically Signed On 2019 12:32:03 CST by Mj Triplett
[email or web address]
 
 
 
 
 
 
 
 
 
 
 
 
 
 
 
 
  <ELECTRONICALLY SIGNED>
                                           By: Mj Triplett MD, FACC   
  19     1232
D: 19 0753   _____________________________________
T: 19 0753   Mj Triplett MD, FAC     /EPI

## 2019-01-20 NOTE — EKG
[place] [address]
Phone:  [phone]                     ELECTROCARDIOGRAM REPORT      
_______________________________________________________________________________
 
Name:       MIKAELA CHAN              Room:           [address]    ADM IN  
M.R.#:  [id number]      Account #:      [id number]  
Admission:  19     Attend Phys:    JOHANNY Neff
Discharge:               Date of Birth:  [birth year]  
         Report #: [id number]
    [id number]
_______________________________________________________________________________
THIS REPORT FOR:  //name//                      
 
                         [facility] ED
                                       
Test Date:    2019               Test Time:    03:25:28
Pat Name:     MIKAELA CHAN          Department:   
Patient ID:   [id number]            Room:         [facility]
Gender:       F                        Technician:   SASHA NOGUERA
:          1934               Requested By: Lisa Pack
Order Number: [id number] MD:   Mj Triplett
                                 Measurements
Intervals                              Axis          
Rate:         66                       P:            
ME:                                    QRS:          29
QRSD:         73                       T:            229
QT:           352                                    
QTc:          369                                    
                           Interpretive Statements
Afib/flut and V-paced complexes
No further rhythm analysis attempted due to paced rhythm
Nonspecific repol abnormality, diffuse leads
Compared to ECG 2019 22:50:39
Early repolarization now present
 
Electronically Signed On 2019 12:31:40 CST by Mj Triplett
[email or web address]
 
 
 
 
 
 
 
 
 
 
 
 
 
 
 
 
 
  <ELECTRONICALLY SIGNED>
                                           By: Mj Triplett MD, FACC   
  19     1231
D: 19 0325   _____________________________________
T: 19 0325   Mj Triplett MD, FAC     /EPI

## 2019-01-20 NOTE — NUR
ASSESSMENT: PT IS ALERT AND ORIENT TIMES FOUR. UP WITH MINIMAL ASSISTANCE.
ARRIVED ON THE UNIT AT APPROXIMATELY 0555. NO SKIN ISSUES. HAS A PACE MAKER.
V-PACED PER MONITOR. ON 2 LITERS, SOB WITH EXERTION. NO COMPLAINTS, DENY PAIN.
SIGNED CONSENT FORM TO TREAT AND FALL CONTRACT. WILL CONTINUE TO MONITOR.

## 2019-01-21 VITALS — SYSTOLIC BLOOD PRESSURE: 136 MMHG | DIASTOLIC BLOOD PRESSURE: 70 MMHG

## 2019-01-21 VITALS — DIASTOLIC BLOOD PRESSURE: 55 MMHG | SYSTOLIC BLOOD PRESSURE: 143 MMHG

## 2019-01-21 VITALS — DIASTOLIC BLOOD PRESSURE: 59 MMHG | SYSTOLIC BLOOD PRESSURE: 117 MMHG

## 2019-01-21 VITALS — DIASTOLIC BLOOD PRESSURE: 56 MMHG | SYSTOLIC BLOOD PRESSURE: 153 MMHG

## 2019-01-21 VITALS — SYSTOLIC BLOOD PRESSURE: 159 MMHG | DIASTOLIC BLOOD PRESSURE: 65 MMHG

## 2019-01-21 VITALS — SYSTOLIC BLOOD PRESSURE: 117 MMHG | DIASTOLIC BLOOD PRESSURE: 60 MMHG

## 2019-01-21 LAB
ALBUMIN SERPL-MCNC: 3 G/DL (ref 3.4–5)
ALP SERPL-CCNC: 54 U/L (ref 46–116)
ALT SERPL-CCNC: 36 U/L (ref 30–65)
ANION GAP SERPL CALC-SCNC: 7 MMOL/L (ref 7–16)
AST SERPL-CCNC: 24 U/L (ref 15–37)
BILIRUB SERPL-MCNC: 0.5 MG/DL
BUN SERPL-MCNC: 35 MG/DL (ref 7–18)
CALCIUM SERPL-MCNC: 8.3 MG/DL (ref 8.5–10.1)
CHLORIDE SERPL-SCNC: 105 MMOL/L (ref 98–107)
CO2 SERPL-SCNC: 29 MMOL/L (ref 21–32)
CREAT SERPL-MCNC: 1.6 MG/DL (ref 0.6–1.3)
GLUCOSE SERPL-MCNC: 147 MG/DL (ref 70–99)
HCT VFR BLD CALC: 29.8 % (ref 37–47)
HGB BLD-MCNC: 10.2 GM/DL (ref 12–15)
MAGNESIUM SERPL-MCNC: 1.7 MG/DL (ref 1.8–2.4)
MCH RBC QN AUTO: 28.8 PG (ref 26–34)
MCHC RBC AUTO-ENTMCNC: 34.1 G/DL (ref 28–37)
MCV RBC: 84.5 FL (ref 80–100)
MPV: 10 FL. (ref 7.2–11.1)
PLATELET COUNT*: 136 THOU/UL (ref 150–400)
POTASSIUM SERPL-SCNC: 3.8 MMOL/L (ref 3.5–5.1)
PROT SERPL-MCNC: 6.4 G/DL (ref 6.4–8.2)
RBC # BLD AUTO: 3.53 MIL/UL (ref 4.2–5)
RDW-CV: 14.9 % (ref 10.5–14.5)
SODIUM SERPL-SCNC: 141 MMOL/L (ref 136–145)
WBC # BLD AUTO: 8.2 THOU/UL (ref 4–11)

## 2019-01-21 NOTE — NUR
SW met with pt to complete initial assessment, introduce self, and SW role.
Pt alert, oriented.  Pt expressed that she felt she was doing better.  Pt
lives at the [facility].  Pt has history of SNF at [facility] and [facility]
[place].  Pt might need oxygen at dc?  Pt does not currently have any DME
at home.  Pt had hx of [facility] in [place], [place].  Pt has supportive family.
SW to continue to follow to assist with safe dc planning.  Possibility for pt
to dc tomorrow.

## 2019-01-21 NOTE — NUR
PATIENT IS ON TELE WITH A PACEMAKER. TELE SHOWED INTERMITTENT V-PACING WITH A
BASELINE RHYTHM OF A-FIB. PATIENT HAD NO C/O PAIN DURING THE SHIFT AND SLEPT
WELL OVERNIGHT.

## 2019-01-21 NOTE — NUR
ASSESSMENT COMPLETE. PT ALERT AND ORIENTED X4. FORGETFUL AND CONFUSED AT
TIMES. PT DENIES PAIN AND N/V. PT IS ON ROOM AIR WITH [place] SATS. PT IS
AFIB/VPACED ON TELE MONITOR. DR SAENZ CONSULTED AND STATES PT IS OK TO DC IN
AM. DIGOXIN LEVEL WITHIN NORMAL LIMITS TODAY. PT TURNS SELF IN BED. SKIN
W/D/I. PT IS UP STANDBY ASSIST. PT IS ON XARELTO, ASPIRIN DC'D TODAY. PT HAS
NO OTHER CONCERNS AT THIS TIME. SEE ASSESSMENT AND VITALS FOR OTHER DETAILS.
CALL LIGHT WITHIN REACH, WILL CONTINUE PLAN OF CARE

## 2019-01-21 NOTE — CON
[address]  [address]                    CONSULTATION                  
_______________________________________________________________________________
 
Name:       MIKAELA CHAN              Room:           [address] [place]  
.R.#:  [id number]      Account #:      [id number]  
Admission:  01/20/19     Attend Phys:    JOHANNY Neff
Discharge:               Date of Birth:  [birth year]  
         Report #: [id number]
                                                                     [id number]  
_______________________________________________________________________________
THIS REPORT FOR:  //name//                      
 
CC: Karlos Rodriguez
 
INDICATION:  Abnormal EKG.
 
HISTORY OF PRESENT ILLNESS:  The patient is a very pleasant 84-year-old white
female who resides in assisted care.  She was admitted to the hospital with
hypoxic respiratory failure and pneumonia.  In this setting, she was felt to
have an abnormal EKG.  She does have chronic atrial fibrillation, status post
pacemaker placement.  She has a history of Takotsubo cardiomyopathy remotely. 
At the present, she is not having any significant shortness of breath or chest
pain.  Telemetry monitoring shows intermittent ventricular pacing with
underlying atrial fibrillation.  EKG, when she is not pacing, shows a deeply
inverted T wave with ST segment depression that is present on her chronic EKGs
as well.  Troponins are unremarkable.
 
PAST MEDICAL HISTORY:
1.  Chronic atrial fibrillation.
2.  Status post pacemaker placement.
3.  Chronic dementia.
4.  History of Takotsubo cardiomyopathy.
5.  Hypertension.
4.  Hyperlipidemia.
 
ALLERGIES:  PROPRANOLOL and LOVASTATIN.
 
HOME MEDICATIONS:  Lipitor 40 mg daily, multivitamin 1 tablet daily, Aricept 5
mg daily, carvedilol 12.5 mg b.i.d., Tylenol p.r.n., Zofran p.r.n.,
diphenhydramine p.r.n., aspirin 81 mg daily, furosemide 20 mg daily, latanoprost
eyedrops at bedtime, Namenda 5 mg b.i.d., Remeron 15 mg at bedtime, potassium
chloride 10 mEq daily, sertraline 25 mg daily, Pepcid 20 mg p.r.n.,
triamcinolone cream topically as directed, digoxin 0.125 mg daily, and diltiazem
90 mg daily.
 
SOCIAL HISTORY:  The patient does not smoke.  She lives in assisted care.  No
alcohol use.
 
FAMILY HISTORY:  Noncontributory.
 
PHYSICAL EXAMINATION:
VITAL SIGNS:  Stable.  Blood pressure 160/60, pulse in the upper 60s and
irregular.
GENERAL:  This is a pleasant elderly female in no distress.  Mood and affect
appropriate.
 
 
 
[place] [address]                    CONSULTATION                  
_______________________________________________________________________________
 
Name:       MIKAELA CHAN              Room:           [address]#:  [id number]      Account #:      [id number]  
Admission:  01/20/19     Attend Phys:    JOHANNY Neff
Discharge:               Date of Birth:  [birth year]  
         Report #: [id number]
                                                                     [id number]  
_______________________________________________________________________________
HEENT:  Extraocular muscles intact.
NECK:  Shows no jugular venous distention.  There are no carotid bruits.
CHEST:  Reveals clear lung fields.
CARDIAC:  Reveals an irregularly irregular rhythm.  Rate is controlled.  I do
not appreciate gallop or murmur.
ABDOMEN:  Reveals normal bowel sounds.
EXTREMITIES:  Shows no edema.  Peripheral pulses palpable.
SKIN:  Dry.
 
LABORATORY DATA:  A 12-lead EKG shows intermittent pacing with underlying atrial
fibrillation.  Her native complexes show T-wave inversion and ST segment
depression that is not significantly changed from prior EKGs.
 
Labs are reviewed.  Troponin less than 0.06.  Chest x-ray shows mild
cardiomegaly and mild vascular congestion.
 
IMPRESSION AND RECOMMENDATIONS:
1.  Abnormal EKG.  Her EKG appears to be intermittently ventricularly paced
versus chronic atrial fibrillation with native complexes.  I am not overly
concerned about the ST segment depression as this has been present on previous
EKGs.  Could check digoxin level to follow this up.  No other changes at this
time.
2.  Chronic atrial fibrillation, rate adequately controlled presently.
3.  Hypertension.  Adjust medications in an effort to improve blood pressure
control.
4.  Acute-on-chronic diastolic heart failure, presently compensated.  We will
give her a single dose of IV Lasix.
5.  Hyperlipidemia.  Continue atorvastatin at current dose.
 
 
 
 
 
 
 
 
 
 
 
 
 
 
 
 
<ELECTRONICALLY SIGNED>
                                        By:  Mj Triplett MD, FACC   
01/21/19     1247
D: 01/20/19 1001_______________________________________
T: 01/20/19 1149Micrenay Triplett MD, FACC      /nt

## 2019-01-22 VITALS — SYSTOLIC BLOOD PRESSURE: 149 MMHG | DIASTOLIC BLOOD PRESSURE: 57 MMHG

## 2019-01-22 VITALS — DIASTOLIC BLOOD PRESSURE: 77 MMHG | SYSTOLIC BLOOD PRESSURE: 152 MMHG

## 2019-01-22 VITALS — SYSTOLIC BLOOD PRESSURE: 152 MMHG | DIASTOLIC BLOOD PRESSURE: 77 MMHG

## 2019-01-22 LAB
ABSOLUTE BASOPHILS: 0.1 THOU/UL (ref 0–0.2)
ABSOLUTE EOSINOPHILS: 0.2 THOU/UL (ref 0–0.7)
ABSOLUTE MONOCYTES: 0.7 THOU/UL (ref 0–1.2)
ALBUMIN SERPL-MCNC: 2.8 G/DL (ref 3.4–5)
ALP SERPL-CCNC: 53 U/L (ref 46–116)
ALT SERPL-CCNC: 36 U/L (ref 30–65)
ANION GAP SERPL CALC-SCNC: 6 MMOL/L (ref 7–16)
AST SERPL-CCNC: 20 U/L (ref 15–37)
BASOPHILS NFR BLD AUTO: 0.7 %
BILIRUB SERPL-MCNC: 0.4 MG/DL
BUN SERPL-MCNC: 38 MG/DL (ref 7–18)
CALCIUM SERPL-MCNC: 8.4 MG/DL (ref 8.5–10.1)
CHLORIDE SERPL-SCNC: 105 MMOL/L (ref 98–107)
CO2 SERPL-SCNC: 31 MMOL/L (ref 21–32)
CREAT SERPL-MCNC: 1.6 MG/DL (ref 0.6–1.3)
EOSINOPHIL NFR BLD: 2.8 %
GLUCOSE SERPL-MCNC: 97 MG/DL (ref 70–99)
GRANULOCYTES NFR BLD MANUAL: 53.1 %
HCT VFR BLD CALC: 31.5 % (ref 37–47)
HGB BLD-MCNC: 10.5 GM/DL (ref 12–15)
LYMPHOCYTES # BLD: 3.1 THOU/UL (ref 0.8–5.3)
LYMPHOCYTES NFR BLD AUTO: 35.4 %
MAGNESIUM SERPL-MCNC: 1.8 MG/DL (ref 1.8–2.4)
MCH RBC QN AUTO: 28 PG (ref 26–34)
MCHC RBC AUTO-ENTMCNC: 33.2 G/DL (ref 28–37)
MCV RBC: 84.3 FL (ref 80–100)
MONOCYTES NFR BLD: 8 %
MPV: 9.7 FL. (ref 7.2–11.1)
NEUTROPHILS # BLD: 4.6 THOU/UL (ref 1.6–8.1)
NUCLEATED RBCS: 0 /100WBC
PLATELET COUNT*: 149 THOU/UL (ref 150–400)
POTASSIUM SERPL-SCNC: 3.7 MMOL/L (ref 3.5–5.1)
PROT SERPL-MCNC: 6.2 G/DL (ref 6.4–8.2)
RBC # BLD AUTO: 3.74 MIL/UL (ref 4.2–5)
RDW-CV: 15.5 % (ref 10.5–14.5)
SODIUM SERPL-SCNC: 142 MMOL/L (ref 136–145)
WBC # BLD AUTO: 8.7 THOU/UL (ref 4–11)

## 2019-01-22 NOTE — NUR
ASSESSMENT COMPLETE. PT ALERT AND ORIENTED X4. PT DENIES PAIN AND N/V. PT
TAKES MEDICATIONS WITHOUT DIFFICULTY AND TOLERATING DIET. DR LIM
ORDERED OXYGEN REST AND EXERCISE AND PASSED WITH ERICK IN RT. PT GIVEN DC
INSTRUCTIONS AND VERBALIZES UNDERSTANDING. IV DC'D WITHOUT COMPLICATIONS.
CALLED [facility] AND GAVE REPORT TO NURSE. DIGOXIN CHANGED TO EVERY OTHER DAY
AND ASPIRIN DC'D PER DR SAENZ. PT TAKEN OUT VIA WHEELCHAIR WITH NURSING STAFF
TO PERSONAL VEHICLE AT 1155. ALL BELONGINGS SENT WITH PT

## 2019-01-22 NOTE — NUR
ASSESMENT:
PT REMAIN ALERT AND ORIENT TIMES FOUR. UP WITH SBA TO THE BR. DENIES PAIN, SOB
AND NAUSEA.  VSS, AFEBRILE. TOLERATING PO DIET. NO COMPLAINTS, JUST READY TO
RETURN TO [facility].  V-PACED PER MONITOR WITH UNDERLINE RHYTHM OF CHRONIC
CONTROLLED A-FIB.  NO SPUTUM/UA COLLECTED.  SLEPT MOST OF THE NIGHT, GOOD
PROGRESS TOWARDS DC GOALS. WILL CONTINUE TO MONITOR.

## 2019-01-22 NOTE — NUR
Pt to dc home today to [facility]; pt family to provide pt ride home.  JOSEP
called [facility] who will accept pt home to [facility] today.  JOSEP provided nurse
number to provide report.  JOSEP confirmed with pt nurse that pt does not qualify
for oxygen.  JOSEP faxed referral and orders to pt/family preference of [facility].  [phone] fax [phone].

## 2019-02-01 ENCOUNTER — HOSPITAL ENCOUNTER (OUTPATIENT)
Dept: HOSPITAL 96 - M.LAB | Age: 84
End: 2019-02-01
Attending: NURSE PRACTITIONER
Payer: MEDICARE

## 2019-02-01 DIAGNOSIS — N28.9: Primary | ICD-10-CM

## 2019-02-01 LAB
ANION GAP SERPL CALC-SCNC: 8 MMOL/L (ref 7–16)
BUN SERPL-MCNC: 27 MG/DL (ref 7–18)
CALCIUM SERPL-MCNC: 9.2 MG/DL (ref 8.5–10.1)
CHLORIDE SERPL-SCNC: 103 MMOL/L (ref 98–107)
CO2 SERPL-SCNC: 30 MMOL/L (ref 21–32)
CREAT SERPL-MCNC: 1.7 MG/DL (ref 0.6–1.3)
GLUCOSE SERPL-MCNC: 78 MG/DL (ref 70–99)
POTASSIUM SERPL-SCNC: 4.2 MMOL/L (ref 3.5–5.1)
SODIUM SERPL-SCNC: 141 MMOL/L (ref 136–145)

## 2019-07-14 ENCOUNTER — HOSPITAL ENCOUNTER (INPATIENT)
Dept: HOSPITAL 96 - M.ERS | Age: 84
LOS: 2 days | Discharge: NURSING, REHAB OR LONG-TERM CARE | DRG: 291 | End: 2019-07-16
Attending: INTERNAL MEDICINE | Admitting: INTERNAL MEDICINE
Payer: MEDICARE

## 2019-07-14 VITALS — SYSTOLIC BLOOD PRESSURE: 158 MMHG | DIASTOLIC BLOOD PRESSURE: 69 MMHG

## 2019-07-14 VITALS — WEIGHT: 141.5 LBS | HEIGHT: 62.01 IN | BODY MASS INDEX: 25.71 KG/M2

## 2019-07-14 VITALS — DIASTOLIC BLOOD PRESSURE: 68 MMHG | SYSTOLIC BLOOD PRESSURE: 191 MMHG

## 2019-07-14 VITALS — SYSTOLIC BLOOD PRESSURE: 168 MMHG | DIASTOLIC BLOOD PRESSURE: 70 MMHG

## 2019-07-14 VITALS — DIASTOLIC BLOOD PRESSURE: 67 MMHG | SYSTOLIC BLOOD PRESSURE: 130 MMHG

## 2019-07-14 VITALS — SYSTOLIC BLOOD PRESSURE: 191 MMHG | DIASTOLIC BLOOD PRESSURE: 68 MMHG

## 2019-07-14 DIAGNOSIS — N18.3: ICD-10-CM

## 2019-07-14 DIAGNOSIS — I49.5: ICD-10-CM

## 2019-07-14 DIAGNOSIS — E03.9: ICD-10-CM

## 2019-07-14 DIAGNOSIS — I50.43: ICD-10-CM

## 2019-07-14 DIAGNOSIS — Z79.890: ICD-10-CM

## 2019-07-14 DIAGNOSIS — F03.90: ICD-10-CM

## 2019-07-14 DIAGNOSIS — E78.5: ICD-10-CM

## 2019-07-14 DIAGNOSIS — F32.9: ICD-10-CM

## 2019-07-14 DIAGNOSIS — I48.2: ICD-10-CM

## 2019-07-14 DIAGNOSIS — I48.91: ICD-10-CM

## 2019-07-14 DIAGNOSIS — Z95.0: ICD-10-CM

## 2019-07-14 DIAGNOSIS — I42.9: ICD-10-CM

## 2019-07-14 DIAGNOSIS — Z88.8: ICD-10-CM

## 2019-07-14 DIAGNOSIS — I25.10: ICD-10-CM

## 2019-07-14 DIAGNOSIS — I13.0: Primary | ICD-10-CM

## 2019-07-14 LAB
ABSOLUTE BASOPHILS: 0.1 THOU/UL (ref 0–0.2)
ABSOLUTE EOSINOPHILS: 1 THOU/UL (ref 0–0.7)
ABSOLUTE MONOCYTES: 0.4 THOU/UL (ref 0–1.2)
ALBUMIN SERPL-MCNC: 3.6 G/DL (ref 3.4–5)
ALP SERPL-CCNC: 71 U/L (ref 46–116)
ALT SERPL-CCNC: 25 U/L (ref 30–65)
ANION GAP SERPL CALC-SCNC: 9 MMOL/L (ref 7–16)
AST SERPL-CCNC: 23 U/L (ref 15–37)
BASOPHILS NFR BLD AUTO: 1 %
BILIRUB SERPL-MCNC: 0.5 MG/DL
BUN SERPL-MCNC: 26 MG/DL (ref 7–18)
CALCIUM SERPL-MCNC: 9.2 MG/DL (ref 8.5–10.1)
CHLORIDE SERPL-SCNC: 105 MMOL/L (ref 98–107)
CO2 SERPL-SCNC: 28 MMOL/L (ref 21–32)
CREAT SERPL-MCNC: 1.5 MG/DL (ref 0.6–1.3)
EOSINOPHIL NFR BLD: 16 %
GLUCOSE SERPL-MCNC: 105 MG/DL (ref 70–99)
GRANULOCYTES NFR BLD MANUAL: 50 %
HCT VFR BLD CALC: 33.5 % (ref 37–47)
HGB BLD-MCNC: 10.8 GM/DL (ref 12–15)
INR PPP: 1.3
LIPASE: 247 U/L (ref 73–393)
LYMPHOCYTES # BLD: 1.6 THOU/UL (ref 0.8–5.3)
LYMPHOCYTES NFR BLD AUTO: 18 %
MAGNESIUM SERPL-MCNC: 2 MG/DL (ref 1.8–2.4)
MCH RBC QN AUTO: 25.8 PG (ref 26–34)
MCHC RBC AUTO-ENTMCNC: 32.3 G/DL (ref 28–37)
MCV RBC: 79.9 FL (ref 80–100)
MONOCYTES NFR BLD: 7 %
MPV: 8.8 FL. (ref 7.2–11.1)
NEUTROPHILS # BLD: 3 THOU/UL (ref 1.6–8.1)
NT-PRO BRAIN NAT PEPTIDE: 2214 PG/ML (ref ?–300)
NUCLEATED RBCS: 0 /100WBC
PLATELET # BLD EST: ADEQUATE 10*3/UL
PLATELET COUNT*: 185 THOU/UL (ref 150–400)
POTASSIUM SERPL-SCNC: 4.3 MMOL/L (ref 3.5–5.1)
PROT SERPL-MCNC: 7 G/DL (ref 6.4–8.2)
PROTHROMBIN TIME: 12.8 SECONDS (ref 9.2–11.5)
RBC # BLD AUTO: 4.2 MIL/UL (ref 4.2–5)
RBC MORPH BLD: NORMAL
RDW-CV: 16.9 % (ref 10.5–14.5)
SODIUM SERPL-SCNC: 142 MMOL/L (ref 136–145)
TROPONIN-I LEVEL: <0.06 NG/ML (ref ?–0.06)
VARIANT LYMPHS NFR BLD MANUAL: 8 %
WBC # BLD AUTO: 6 THOU/UL (ref 4–11)

## 2019-07-14 NOTE — NUR
PATIENT ARRIVED FROM ER THIS AFTERNNON. PATIENT SETTLED TO ROOM AND HISTORY,
ASSESSMENT AND VITALS COMPLETED AND DOCUMENTED. PATIENT DENIES ANY PAIN.
PATIENT IS UP TO BATHROOM WITH STANDBY ASSIST. PATIENTHAS GOOD APPETITE.
PATIENT DENIES ANY NEEDS AT THIS TIME. CALL LIGHT WITHIN REACH. WILL CONTINUE
TO MONITOR.

## 2019-07-15 VITALS — DIASTOLIC BLOOD PRESSURE: 61 MMHG | SYSTOLIC BLOOD PRESSURE: 152 MMHG

## 2019-07-15 VITALS — DIASTOLIC BLOOD PRESSURE: 50 MMHG | SYSTOLIC BLOOD PRESSURE: 116 MMHG

## 2019-07-15 VITALS — DIASTOLIC BLOOD PRESSURE: 63 MMHG | SYSTOLIC BLOOD PRESSURE: 122 MMHG

## 2019-07-15 VITALS — SYSTOLIC BLOOD PRESSURE: 126 MMHG | DIASTOLIC BLOOD PRESSURE: 63 MMHG

## 2019-07-15 VITALS — DIASTOLIC BLOOD PRESSURE: 64 MMHG | SYSTOLIC BLOOD PRESSURE: 123 MMHG

## 2019-07-15 LAB
ANION GAP SERPL CALC-SCNC: 7 MMOL/L (ref 7–16)
BUN SERPL-MCNC: 21 MG/DL (ref 7–18)
CALCIUM SERPL-MCNC: 9.3 MG/DL (ref 8.5–10.1)
CHLORIDE SERPL-SCNC: 103 MMOL/L (ref 98–107)
CO2 SERPL-SCNC: 31 MMOL/L (ref 21–32)
CREAT SERPL-MCNC: 1.4 MG/DL (ref 0.6–1.3)
GLUCOSE SERPL-MCNC: 109 MG/DL (ref 70–99)
POTASSIUM SERPL-SCNC: 3.9 MMOL/L (ref 3.5–5.1)
SODIUM SERPL-SCNC: 141 MMOL/L (ref 136–145)

## 2019-07-15 NOTE — 2DMMODE
[place] [address]
Phone:  [phone] 2 D/M-MODE ECHOCARDIOGRAM     
_______________________________________________________________________________
 
Name:         MIKAELA CHAN             Room:          [facility] [place] 
[facility]#:    [id number]     Account #:     [id number]  
Admission:    19    Attend Phys:   Eric Joyner
Discharge:                Date of Birth: [birth year]  
Date of Service: 07/15/19 1710  Report #:      [id number]
        [id number]
_______________________________________________________________________________
THIS REPORT FOR:  //name//                      
 
 
--------------- APPROVED REPORT --------------
 
 
Study performed:  07/15/2019 10:53:23
 
EXAM: Comprehensive 2D, Doppler, and color-flow 
Echocardiogram 
Patient Location: In-Patient   
Room #:  202     Status:  routine
 
      BSA:         1.66
HR: 82 bpm BP:          122/63 mmHg 
Rhythm: NSR     
 
Other Information 
Study Quality: Good
 
Indications
Congestive Heart Failure
 
2D Dimensions
IVSd:  10.02 (7-11mm) LVOT Diam:  18.61 (18-24mm) 
LVDd:  44.97 mm  
PWd:  9.33 (7-11mm) Ascending Ao:  34.22 (22-36mm)
LVDs:  30.56 (25-40mm) 
Aortic Root:  29.46 mm 
 
Volumes
Left Atrial Volume (Systole) 
    LA ESV Index:  75.70 mL/m2
 
Aortic Valve
AoV Peak Rich.:  1.38 m/s 
AO Peak Gr.:  7.57 mmHg  LVOT Max P.42 mmHg
AO Mean Gr.:  4.12 mmHg  LVOT Mean P.08 mmHg
    LVOT Max V:  0.78 m/s
AO V2 VTI:  27.00 cm  LVOT Mean V:  0.47 m/s
SYLVIA (VTI):  1.62 cm2  LVOT V1 VTI:  16.05 cm
AI Metcalfe:  2.70 m/s2  
AI PHT:  475.71 ms  
 
Mitral Valve
   MV Decel. Time:  119.30 ms
 
 
[place] [address]
Phone:  [phone]                     2 D/M-MODE ECHOCARDIOGRAM     
_______________________________________________________________________________
 
Name:         MIKAELA CHAN J             Room:          [address] [place] 
.R.#:    [id number]     Account #:     [id number]  
Admission:    19    Attend Phys:   Eric Joyner
Discharge:                Date of Birth: [birth year]  
Date of Service: 07/15/19 1710  Report #:      [id number]
        [id number]
_______________________________________________________________________________
MV PHT:  34.60 ms 
MVA (PHT):  6.36 cm2 
 
TDI
 Medial E' Rich.:  0.11 m/s
 Lateral E' Rich.:  0.14 m/s
 
Pulmonary Valve
PV Peak Rich.:  0.61 m/s PV Peak Gr.:  1.49 mmHg
 
Tricuspid Valve
    RAP Estimate:  5.00 mmHg
TR Peak Gr.:  30.16 mmHg RVSP:  35.00 mmHg
    PA Pressure:  35.00 mmHg
 
Left Ventricle
The left ventricle is normal size. There is normal LV segmental wall 
motion. There is normal left ventricular wall thickness. Left 
ventricular systolic function is mildly decreased. LVEF is 45-50%. 
This study is not technically sufficient to allow evaluation of the 
LV diastolic function due to atrial fibrillation.
 
Right Ventricle
The right ventricle is normal size. The right ventricular systolic 
function is normal. Pacemaker lead is present in the right ventricle. 
 
Atria
Left atrium is severely dilated. Right atrium is dilated.
 
Aortic Valve
Mild aortic valve sclerosis. Mild to moderate aortic regurgitation. 
No hemodynamically significant valvular aortic stenosis.
 
Mitral Valve
The mitral valve is normal in structure. There is no mitral valve 
regurgitation noted. No evidence of mitral valve stenosis.
 
Tricuspid Valve
The tricuspid valve is normal in structure. Moderate tricuspid 
regurgitation. Mild pulmonary hypertension.
 
Pulmonic Valve
The pulmonary valve is normal in structure. There is no pulmonic 
valvular regurgitation.
 
[facility]
 
 
[place] [address]
Phone:  [phone]                     2 D/M-MODE ECHOCARDIOGRAM     
_______________________________________________________________________________
 
Name:         MIKAELA CHAN             Room:          [address] [place] 
[facility]#:    [id number]     Account #:     [id number]  
Admission:    19    Attend Phys:   Eric Joyner
Discharge:                Date of Birth: [birth year]  
Date of Service: 07/15/19 1710  Report #:      [id number]
        [id number]
_______________________________________________________________________________
The aortic root is normal in size. IVC is normal in size and 
collapses >50% with inspiration.
 
Pericardium
There is no pericardial effusion.
 
<Conclusion>
The left ventricle is normal size.
There is normal left ventricular wall thickness.
Left ventricular systolic function is mildly decreased.
LVEF is 45-50%.
This study is not technically sufficient to allow evaluation of the 
LV diastolic function due to atrial fibrillation.
The right ventricle is normal size.
Left atrium is severely dilated.
Right atrium is dilated.
Mild aortic valve sclerosis.
Mild to moderate aortic regurgitation.
No hemodynamically significant valvular aortic stenosis.
The mitral valve is normal in structure.
The tricuspid valve is normal in structure.
Moderate tricuspid regurgitation.
Mild pulmonary hypertension.
IVC is normal in size and collapses >50% with inspiration.
There is no pericardial effusion.
There is normal LV segmental wall motion.
Pacemaker lead is present in the right ventricle.
 
 
 
 
 
 
 
 
 
 
 
 
 
 
 
 
 
  <ELECTRONICALLY SIGNED>
                                           By: John Sorto MD, FACC     
  07/15/19     1710
D: 07/15/19 1710   _____________________________________
T: 07/15/19 1710   John Sorto MD, FACC       /INF

## 2019-07-15 NOTE — NUR
ASSUMED PT CARE REPORT RECEIVED FROM NURSE. PT IS AOX4 TRACING AFIB ON CARDIAC
MONITOR. ON RA. PT VSS ARE STABLE. NEW ORDER OF CHEST XRAY AND LASIX RECEIVED
AND ADMINISTERED AS ORDERED. CARDIOLOGY CONSULTED. ECHO WAS ORDERED.
CARDIOLOGY IS STILL PENDING TO SEE PT. WILL CONTINUE TO MONITOR.

## 2019-07-15 NOTE — NUR
PT CARE ASSUMED AT 1930. SAT MAINTAINED IN RA. ALERT AND ORIENTED X4. CALL
LIGHT WITHIN REACH AND BED IN LOW POSITION. DENIES PAIN AND SOB. HOURLY
ROUNDING DONE FOR PT SAFETY.

## 2019-07-15 NOTE — NUR
Pt is A&O. Resides at [facility], plans to return at [place], Pt is hopeful
that she will be able to dc later today. No DME. Hx of [facility]. Hx of
skilled at [facility] in [place]. Pt is independent with
ADLs, [facility] completes cooking and cleaning. Supportive family in room. Family to
transport at [place]. Following.

## 2019-07-15 NOTE — EKG
[place] [address]
Phone:  [phone]                     ELECTROCARDIOGRAM REPORT      
_______________________________________________________________________________
 
Name:       MIKAELA CHAN              Room:           [address]    ADM IN  
M.R.#:  [id number]      Account #:      [id number]  
Admission:  19     Attend Phys:    GABRIELLE Glover
Discharge:               Date of Birth:  [birth year]  
         Report #: [id number]
    [id number]
_______________________________________________________________________________
THIS REPORT FOR:  //name//                      
 
                         [facility] ED
                                       
Test Date:    2019               Test Time:    12:29:04
Pat Name:     MIKAELA CHAN          Department:   
Patient ID:   [id number]            Room:         [facility]
Gender:       F                        Technician:   
:          1934               Requested By: Gautam Wheat
Order Number: [id number] MD:   John Sorto
                                 Measurements
Intervals                              Axis          
Rate:         77                       P:            
VA:                                    QRS:          11
QRSD:         87                       T:            -85
QT:           420                                    
QTc:          476                                    
                           Interpretive Statements
Afib/flut and V-paced complexes; rare pvc's
No further rhythm analysis attempted due to paced rhythm
Borderline repolarization abnormality
Compared to ECG 2019 07:53:44
No significant changes
 
Electronically Signed On 7- 12:29:20 CDT by John Sorto
[email or web address]
 
 
 
 
 
 
 
 
 
 
 
 
 
 
 
 
 
  <ELECTRONICALLY SIGNED>
                                           By: John Sorto MD, [facility]     
  07/15/19     1229
D: 19 1229   _____________________________________
T: 19 1229   John Sorto MD, [facility]       /EPI

## 2019-07-16 VITALS — SYSTOLIC BLOOD PRESSURE: 124 MMHG | DIASTOLIC BLOOD PRESSURE: 57 MMHG

## 2019-07-16 VITALS — SYSTOLIC BLOOD PRESSURE: 112 MMHG | DIASTOLIC BLOOD PRESSURE: 52 MMHG

## 2019-07-16 VITALS — SYSTOLIC BLOOD PRESSURE: 102 MMHG | DIASTOLIC BLOOD PRESSURE: 48 MMHG

## 2019-07-16 VITALS — DIASTOLIC BLOOD PRESSURE: 51 MMHG | SYSTOLIC BLOOD PRESSURE: 114 MMHG

## 2019-07-16 VITALS — SYSTOLIC BLOOD PRESSURE: 129 MMHG | DIASTOLIC BLOOD PRESSURE: 66 MMHG

## 2019-07-16 VITALS — DIASTOLIC BLOOD PRESSURE: 46 MMHG | SYSTOLIC BLOOD PRESSURE: 94 MMHG

## 2019-07-16 VITALS — DIASTOLIC BLOOD PRESSURE: 71 MMHG | SYSTOLIC BLOOD PRESSURE: 113 MMHG

## 2019-07-16 NOTE — NUR
PT CARE ASSUMED AT 1930. SAT MAINTAINED IN RA. CALL LIGHT WITHIN REACH AND BED
IN LOW POSITION. DENIES PAIN AND SOB. ALERT AND ORIENTED X4. HOURLY ROUNDING
DONE FOR PT SAFETY.

## 2019-07-16 NOTE — NUR
PHYSICAL THERAPIST WENT TO WALK WITH PT. PT FEELS WEAK AND DIZZY. VS TAKEN. BP
94/46, HR 67. PT IS TOO WEAK TO WALK. WILL HOLD ON TO DISCHARGE AND
COMMUNICATE WITH DR BEFORE RELEASING PT.

## 2019-07-16 NOTE — NUR
ASSUMED PT CARE REPORT RECEIVED FROM NURSE. PT IS AOX4. TRACING AFLUTTER/AFIB
ON CARDIAC MONITOR. RATE CONTROLLED.VSS. DISCHARGE PENDING. PT WALKED WITH
PHYSICAL THERAPY. ON RA, AND O2 SATURATION IS 96%. WILL CONTINUE TO MONITOR
PT.

## 2019-07-16 NOTE — NUR
DC orders written. Pt worked with PT, at which point Pt's BP was low. Plan dc
later today if BP normalizes.

## 2019-07-16 NOTE — NUR
PT LEFT FLOOR ACCOMPANIED BY NURSING STAFF AND DAUGHTER ON A WHEELCHAIR. LAST
BP TAKEN /57 HR 67. REPORT CALLED AND GIVEN TO NURSE FROM [facility]

## 2019-07-18 NOTE — CON
[facility] 
[address] [place]  [address]                    CONSULTATION                  
_______________________________________________________________________________
 
Name:       MIKAELA CHAN              Room:           [address] [place]  
M.R.#:  [id number]      Account #:      [id number]  
Admission:  07/14/19     Attend Phys:    GABRIELLE Glover
Discharge:  07/16/19     Date of Birth:  [birth year]  
         Report #: [id number]
                                                                     [id number]  
_______________________________________________________________________________
THIS REPORT FOR:  //name//                      
 
CC: Myron Joyner
 
DATE OF SERVICE:  07/16/2019
 
 
INDICATION:  Chest tightness, shortness of breath.
 
HISTORY OF PRESENT ILLNESS:  The patient is a very pleasant 85-year-old white
female who was brought to the hospital with complaints of increasing dyspnea and
chest tightness.  She did rule out for myocardial infarction.  EKG did not show
any ST elevation.  Her NT-proBNP was elevated consistent with acute heart
failure.  The patient was diuresed and has had prompt resolution of her
symptoms.  She is no longer having chest tightness or dyspnea.
 
She has a history of sick sinus syndrome and now chronic atrial fibrillation. 
She is status post dual chamber pacemaker placement remotely.  She is
chronically anticoagulated and having no bleeding problems.  In 2017, she had
stress-induced cardiomyopathy.  Echocardiogram on this admission shows an EF of
40 to 45%.
 
She has moderate aortic insufficiency that is clinically stable.
 
PAST MEDICAL HISTORY:
1.  Sick sinus syndrome and now chronic atrial fibrillation.
2.  Status post pacemaker placement.
3.  Coronary artery disease.
4.  Hypertension.
5.  Hyperlipidemia.
6.  Chronic renal insufficiency.
7.  Hypothyroidism.
 
FAMILY HISTORY:  Noncontributory.
 
SOCIAL HISTORY:  The patient is .  She is a lifelong nonsmoker.  She does
not drink alcohol.
 
REVIEW OF SYSTEMS:  A 14-point review of systems is positive for dyspnea,
hypothyroidism.  She wears glasses without acute visual change, otherwise
unremarkable.
 
PHYSICAL EXAMINATION:
VITAL SIGNS:  Stable.  Blood pressure 129/66, pulse is in the 70s and irregular.
GENERAL:  This is a pleasant elderly female in no distress.  Mood and affect
 
 
 
[place] [address]                    CONSULTATION                  
_______________________________________________________________________________
 
Name:       MIKAELA CHAN              Room:           [address]#:  [id number]      Account #:      [id number]  
Admission:  07/14/19     Attend Phys:    GABRIELLE Glover
Discharge:  07/16/19     Date of Birth:  [birth year]  
         Report #: [id number]
                                                                     [id number]  
_______________________________________________________________________________
appropriate.
HEENT:  The patient is wearing glasses.  Extraocular muscles intact.  Mucous
membranes moist.
NECK:  Shows no jugular venous distention.  There are no carotid bruits.
CHEST:  Reveals clear lung fields.
CARDIOVASCULAR:  Reveals an irregularly irregular rhythm with a 2/6 systolic
ejection murmur.
ABDOMEN:  Reveals normal bowel sounds.  The abdomen is soft, nontender.
EXTREMITIES:  Shows no edema.  Peripheral pulses palpable.
SKIN:  Warm and dry.
 
LABORATORY DATA:  Reviewed.  Sodium 141, potassium 3.9, chloride 103,
bicarbonate 31, BUN 21, creatinine 1.4, serum glucose 109.  LFTs are within
normal limits.  Troponins less than 0.06 on 3 separate occasions.  NT-proBNP was
2214.  White blood cell count 6.0, hemoglobin 10.8, and platelet count 185,000.
 
IMPRESSION AND RECOMMENDATIONS:
1.  Acute on chronic combined heart failure.  Symptoms improved with IV Lasix. 
We would recommend discharge dose of 40 mg daily.
2.  Chronic atrial fibrillation, rate adequately controlled.  The patient is
chronically anticoagulated having no bleeding problems, continue as outlined
above.
3.  Coronary artery disease, nonocclusive by remote catheterization, patient
presently stable.  We will arrange outpatient stress testing to evaluate
complaints of chest tightness.
4.  Hypertension, well controlled on current medication.
5.  Hyperlipidemia.  Continue atorvastatin at current dose.
 
At this point in time, the patient appears stable for discharge from a cardiac
standpoint.
 
 
 
 
 
 
 
 
 
 
 
 
 
 
<ELECTRONICALLY SIGNED>
                                        By:  Mj Triplett MD, FACC   
07/18/19     1702
D: 07/16/19 0953_______________________________________
T: 07/16/19 1137Micrenay Triplett MD, FACC      /nt

## 2019-10-04 ENCOUNTER — HOSPITAL ENCOUNTER (INPATIENT)
Dept: HOSPITAL 96 - M.ERS | Age: 84
LOS: 3 days | Discharge: HOME HEALTH | DRG: 291 | End: 2019-10-07
Attending: INTERNAL MEDICINE | Admitting: INTERNAL MEDICINE
Payer: MEDICARE

## 2019-10-04 VITALS — HEIGHT: 60.98 IN | BODY MASS INDEX: 27.77 KG/M2 | WEIGHT: 147.1 LBS

## 2019-10-04 VITALS — DIASTOLIC BLOOD PRESSURE: 77 MMHG | SYSTOLIC BLOOD PRESSURE: 175 MMHG

## 2019-10-04 VITALS — DIASTOLIC BLOOD PRESSURE: 106 MMHG | SYSTOLIC BLOOD PRESSURE: 139 MMHG

## 2019-10-04 VITALS — SYSTOLIC BLOOD PRESSURE: 153 MMHG | DIASTOLIC BLOOD PRESSURE: 56 MMHG

## 2019-10-04 DIAGNOSIS — D68.69: ICD-10-CM

## 2019-10-04 DIAGNOSIS — N18.3: ICD-10-CM

## 2019-10-04 DIAGNOSIS — E78.5: ICD-10-CM

## 2019-10-04 DIAGNOSIS — I13.0: Primary | ICD-10-CM

## 2019-10-04 DIAGNOSIS — E46: ICD-10-CM

## 2019-10-04 DIAGNOSIS — I27.20: ICD-10-CM

## 2019-10-04 DIAGNOSIS — Z82.49: ICD-10-CM

## 2019-10-04 DIAGNOSIS — I48.91: ICD-10-CM

## 2019-10-04 DIAGNOSIS — Z79.899: ICD-10-CM

## 2019-10-04 DIAGNOSIS — Z88.8: ICD-10-CM

## 2019-10-04 DIAGNOSIS — Z79.01: ICD-10-CM

## 2019-10-04 DIAGNOSIS — Z95.0: ICD-10-CM

## 2019-10-04 DIAGNOSIS — F32.9: ICD-10-CM

## 2019-10-04 DIAGNOSIS — E03.9: ICD-10-CM

## 2019-10-04 DIAGNOSIS — Z79.82: ICD-10-CM

## 2019-10-04 DIAGNOSIS — F03.90: ICD-10-CM

## 2019-10-04 DIAGNOSIS — E86.0: ICD-10-CM

## 2019-10-04 DIAGNOSIS — I50.23: ICD-10-CM

## 2019-10-04 DIAGNOSIS — I25.10: ICD-10-CM

## 2019-10-04 LAB
ABSOLUTE EOSINOPHILS: 0.3 THOU/UL (ref 0–0.7)
ABSOLUTE MONOCYTES: 0.3 THOU/UL (ref 0–1.2)
ALBUMIN SERPL-MCNC: 3.6 G/DL (ref 3.4–5)
ALP SERPL-CCNC: 71 U/L (ref 46–116)
ALT SERPL-CCNC: 27 U/L (ref 30–65)
ANION GAP SERPL CALC-SCNC: 6 MMOL/L (ref 7–16)
AST SERPL-CCNC: 21 U/L (ref 15–37)
BILIRUB SERPL-MCNC: 0.2 MG/DL
BILIRUB UR-MCNC: NEGATIVE MG/DL
BUN SERPL-MCNC: 24 MG/DL (ref 7–18)
CALCIUM SERPL-MCNC: 9.1 MG/DL (ref 8.5–10.1)
CHLORIDE SERPL-SCNC: 103 MMOL/L (ref 98–107)
CO2 SERPL-SCNC: 28 MMOL/L (ref 21–32)
COLOR UR: YELLOW
CREAT SERPL-MCNC: 1.6 MG/DL (ref 0.6–1.3)
EOSINOPHIL NFR BLD: 5 %
GLUCOSE SERPL-MCNC: 163 MG/DL (ref 70–99)
GRANULOCYTES NFR BLD MANUAL: 61 %
HCT VFR BLD CALC: 32.4 % (ref 37–47)
HGB BLD-MCNC: 10.6 GM/DL (ref 12–15)
KETONES UR STRIP-MCNC: NEGATIVE MG/DL
LIPASE: 328 U/L (ref 73–393)
LYMPHOCYTES # BLD: 1.9 THOU/UL (ref 0.8–5.3)
LYMPHOCYTES NFR BLD AUTO: 27 %
MCH RBC QN AUTO: 25.9 PG (ref 26–34)
MCHC RBC AUTO-ENTMCNC: 32.7 G/DL (ref 28–37)
MCV RBC: 79.1 FL (ref 80–100)
MONOCYTES NFR BLD: 4 %
MPV: 8.9 FL. (ref 7.2–11.1)
NEUTROPHILS # BLD: 3.8 THOU/UL (ref 1.6–8.1)
NUCLEATED RBCS: 0 /100WBC
PLATELET # BLD EST: ADEQUATE 10*3/UL
PLATELET COUNT*: 198 THOU/UL (ref 150–400)
POTASSIUM SERPL-SCNC: 4.6 MMOL/L (ref 3.5–5.1)
PROT SERPL-MCNC: 7.1 G/DL (ref 6.4–8.2)
PROT UR QL STRIP: NEGATIVE
RBC # BLD AUTO: 4.1 MIL/UL (ref 4.2–5)
RBC # UR STRIP: (no result) /UL
RBC #/AREA URNS HPF: (no result) /HPF (ref 0–2)
RBC MORPH BLD: NORMAL
RDW-CV: 16 % (ref 10.5–14.5)
SODIUM SERPL-SCNC: 137 MMOL/L (ref 136–145)
SP GR UR STRIP: 1.01 (ref 1–1.03)
SQUAMOUS: (no result) /LPF (ref 0–3)
TROPONIN-I LEVEL: <0.06 NG/ML (ref ?–0.06)
URINE CLARITY: CLEAR
URINE GLUCOSE-RANDOM: NEGATIVE
URINE LEUKOCYTES-REFLEX: (no result)
URINE NITRITE-REFLEX: NEGATIVE
URINE WBC-REFLEX: (no result) /HPF (ref 0–5)
UROBILINOGEN UR STRIP-ACNC: 0.2 E.U./DL (ref 0.2–1)
VARIANT LYMPHS NFR BLD MANUAL: 3 %
WBC # BLD AUTO: 6.3 THOU/UL (ref 4–11)

## 2019-10-05 VITALS — SYSTOLIC BLOOD PRESSURE: 179 MMHG | DIASTOLIC BLOOD PRESSURE: 71 MMHG

## 2019-10-05 VITALS — DIASTOLIC BLOOD PRESSURE: 52 MMHG | SYSTOLIC BLOOD PRESSURE: 113 MMHG

## 2019-10-05 VITALS — SYSTOLIC BLOOD PRESSURE: 147 MMHG | DIASTOLIC BLOOD PRESSURE: 79 MMHG

## 2019-10-05 VITALS — DIASTOLIC BLOOD PRESSURE: 73 MMHG | SYSTOLIC BLOOD PRESSURE: 134 MMHG

## 2019-10-05 VITALS — DIASTOLIC BLOOD PRESSURE: 64 MMHG | SYSTOLIC BLOOD PRESSURE: 154 MMHG

## 2019-10-05 VITALS — DIASTOLIC BLOOD PRESSURE: 66 MMHG | SYSTOLIC BLOOD PRESSURE: 139 MMHG

## 2019-10-05 VITALS — SYSTOLIC BLOOD PRESSURE: 153 MMHG | DIASTOLIC BLOOD PRESSURE: 52 MMHG

## 2019-10-06 VITALS — SYSTOLIC BLOOD PRESSURE: 130 MMHG | DIASTOLIC BLOOD PRESSURE: 64 MMHG

## 2019-10-06 VITALS — DIASTOLIC BLOOD PRESSURE: 60 MMHG | SYSTOLIC BLOOD PRESSURE: 125 MMHG

## 2019-10-06 VITALS — DIASTOLIC BLOOD PRESSURE: 40 MMHG | SYSTOLIC BLOOD PRESSURE: 130 MMHG

## 2019-10-06 LAB
ANION GAP SERPL CALC-SCNC: 11 MMOL/L (ref 7–16)
BUN SERPL-MCNC: 25 MG/DL (ref 7–18)
CALCIUM SERPL-MCNC: 9.1 MG/DL (ref 8.5–10.1)
CHLORIDE SERPL-SCNC: 103 MMOL/L (ref 98–107)
CO2 SERPL-SCNC: 29 MMOL/L (ref 21–32)
CREAT SERPL-MCNC: 2 MG/DL (ref 0.6–1.3)
GLUCOSE SERPL-MCNC: 86 MG/DL (ref 70–99)
HCT VFR BLD CALC: 34.4 % (ref 37–47)
HGB BLD-MCNC: 11.2 GM/DL (ref 12–15)
MCH RBC QN AUTO: 25.6 PG (ref 26–34)
MCHC RBC AUTO-ENTMCNC: 32.4 G/DL (ref 28–37)
MCV RBC: 79.1 FL (ref 80–100)
MPV: 9.3 FL. (ref 7.2–11.1)
PLATELET COUNT*: 214 THOU/UL (ref 150–400)
POTASSIUM SERPL-SCNC: 4.6 MMOL/L (ref 3.5–5.1)
RBC # BLD AUTO: 4.35 MIL/UL (ref 4.2–5)
RDW-CV: 16.2 % (ref 10.5–14.5)
SODIUM SERPL-SCNC: 143 MMOL/L (ref 136–145)
WBC # BLD AUTO: 7.2 THOU/UL (ref 4–11)

## 2019-10-06 PROCEDURE — 4B02XSZ MEASUREMENT OF CARDIAC PACEMAKER, EXTERNAL APPROACH: ICD-10-PCS | Performed by: INTERNAL MEDICINE

## 2019-10-07 VITALS — SYSTOLIC BLOOD PRESSURE: 161 MMHG | DIASTOLIC BLOOD PRESSURE: 77 MMHG

## 2019-10-07 VITALS — SYSTOLIC BLOOD PRESSURE: 146 MMHG | DIASTOLIC BLOOD PRESSURE: 65 MMHG

## 2019-10-07 VITALS — DIASTOLIC BLOOD PRESSURE: 79 MMHG | SYSTOLIC BLOOD PRESSURE: 178 MMHG

## 2019-10-07 VITALS — SYSTOLIC BLOOD PRESSURE: 193 MMHG | DIASTOLIC BLOOD PRESSURE: 85 MMHG

## 2019-10-07 LAB
ANION GAP SERPL CALC-SCNC: 11 MMOL/L (ref 7–16)
BUN SERPL-MCNC: 28 MG/DL (ref 7–18)
CALCIUM SERPL-MCNC: 8.6 MG/DL (ref 8.5–10.1)
CHLORIDE SERPL-SCNC: 104 MMOL/L (ref 98–107)
CO2 SERPL-SCNC: 22 MMOL/L (ref 21–32)
CREAT SERPL-MCNC: 1.5 MG/DL (ref 0.6–1.3)
GLUCOSE SERPL-MCNC: 122 MG/DL (ref 70–99)
POTASSIUM SERPL-SCNC: 3.9 MMOL/L (ref 3.5–5.1)
SODIUM SERPL-SCNC: 137 MMOL/L (ref 136–145)

## 2019-10-07 NOTE — EKG
[place] [address]
Phone:  [phone]                     ELECTROCARDIOGRAM REPORT      
_______________________________________________________________________________
 
Name:       MIKAELA CHAN              Room:           [address]    ADM [place]  
M.R.#:  [id number]      Account #:      [id number]  
Admission:  10/04/19     Attend Phys:    JOHANNY Neff
Discharge:               Date of Birth:  [birth year]  
         Report #: [id number]
    [id number]
_______________________________________________________________________________
THIS REPORT FOR:  //name//                      
 
                         [facility] ED
                                       
Test Date:    2019-10-04               Test Time:    19:33:07
Pat Name:     MIKAELA CHAN          Department:   
Patient ID:   [id number]            Room:         [facility]
Gender:       F                        Technician:   PHIL
:          1934               Requested By: Aaron Blackmon
Order Number: [id number] MD:   John Sorto
                                 Measurements
Intervals                              Axis          
Rate:         80                       P:            
CT:                                    QRS:          50
QRSD:         76                       T:            -66
QT:           310                                    
QTc:          358                                    
                           Interpretive Statements
Atrial fibrillation
Consider anterior infarct
Nonspecific repol abnormality, lateral leads
Compared to ECG 2019 12:29:04
Myocardial infarct finding now present
Ventricular-paced complex(es) or rhythm no longer present
 
Electronically Signed On 10-7-2019 16:11:03 CDT by John Sorto
[email or web address]
 
 
 
 
 
 
 
 
 
 
 
 
 
 
 
 
  <ELECTRONICALLY SIGNED>
                                           By: John Sorto MD, FAC     
  10/07/19     1611
D: 10/04/19 1933   _____________________________________
T: 10/04/19 1933   John Sorto MD, [facility]       /EPI

## 2019-10-07 NOTE — 2DMMODE
[place] [address]
Phone:  [phone] 2 D/M-MODE ECHOCARDIOGRAM     
_______________________________________________________________________________
 
Name:         MIKAELA CHAN             Room:          [facility]    [facility] [place] 
.R#:    [id number]     Account #:     [id number]  
Admission:    10/04/19    Attend Phys:   Karlos Coronado
Discharge:                Date of Birth: [birth year]  
Date of Service: 10/07/19 1520  Report #:      [id number]
        [id number]
_______________________________________________________________________________
THIS REPORT FOR:  //name//                      
 
 
--------------- APPROVED REPORT --------------
 
 
Study performed:  10/07/2019 14:05:02
 
EXAM: Comprehensive 2D, Doppler, and color-flow 
Echocardiogram 
Patient Location: In-Patient   
Room #:  [facility]     
 
      BSA:         1.66
HR: 84 bpm BP:          130/40 mmHg 
 
Other Information 
Study Quality: Good
 
Indications
Atrial Fibrillation
Dyspnea 
 
2D Dimensions
IVSd:  14.84 (7-11mm) LVOT Diam:  19.69 (18-24mm) 
LVDd:  42.00 mm  
PWd:  10.12 (7-11mm) Ascending Ao:  33.31 (22-36mm)
LVDs:  26.63 (25-40mm) 
Aortic Root:  26.60 mm 
 
Volumes
Left Atrial Volume (Systole) 
    LA ESV Index:  78.10 mL/m2
 
Aortic Valve
AoV Peak Rich.:  1.38 m/s 
AO Peak Gr.:  7.56 mmHg  LVOT Max PG:  3.13 mmHg
AO Mean Gr.:  3.81 mmHg  LVOT Mean P.24 mmHg
    LVOT Max V:  0.88 m/s
AO V2 VTI:  23.49 cm  LVOT Mean V:  0.50 m/s
SYLVIA (VTI):  1.84 cm2  LVOT V1 VTI:  14.20 cm
AI Kingman:  3.81 m/s2  
AI PHT:  360.63 ms  
 
Mitral Valve
    MV Decel. Time:  99.45 ms
 
 
[place] [address]
Phone:  [phone]                     2 D/M-MODE ECHOCARDIOGRAM     
_______________________________________________________________________________
 
Name:         CHAN,MARY J             Room:          [address] [place] 
.R.#:    [id number]     Account #:     [id number]  
Admission:    10/04/19    Attend Phys:   Karlos Coronado
Discharge:                Date of Birth: [birth year]  
Date of Service: 10/07/19 1520  Report #:      [id number]
        [id number]
_______________________________________________________________________________
MV E Max Rich.:  1.09 m/s 
MV PHT:  28.84 ms  
MVA (PHT):  7.63 cm2  
 
TDI
E/Lateral E':  7.79 E/Medial E':  10.90
   Medial E' Rich.:  0.10 m/s
   Lateral E' Rich.:  0.14 m/s
 
Pulmonary Valve
PV Peak Rich.:  0.76 m/s PV Peak Gr.:  2.33 mmHg
 
Tricuspid Valve
    RAP Estimate:  5.00 mmHg
TR Peak Gr.:  31.79 mmHg RVSP:  36.79 mmHg
    PA Pressure:  36.79 mmHg
 
Left Ventricle
The left ventricle is normal size. There is normal LV segmental wall 
motion. There is normal left ventricular wall thickness. Left 
ventricular systolic function is normal. The left ventricular 
ejection fraction is within the normal range. LVEF is 60%. This study 
is not technically sufficient to allow evaluation of the LV diastolic 
function due to atrial fibrillation.
 
Right Ventricle
The right ventricle is normal size. The right ventricular systolic 
function is normal.
 
Atria
Left atrium is moderately dilated. Right atrium is mildly 
dilated.
 
Aortic Valve
Mild aortic valve sclerosis. Mild aortic regurgitation. There is no 
aortic valvular stenosis.
 
Mitral Valve
The mitral valve is normal in structure. Mild to moderate mitral 
regurgitation. No evidence of mitral valve stenosis.
 
Tricuspid Valve
The tricuspid valve is normal in structure. Moderate tricuspid 
regurgitation. The RVSP is __52_____ mmHg.
 
Pulmonic Valve
 
 
[place] [address]
Phone:  [phone]                     2 D/M-MODE ECHOCARDIOGRAM     
_______________________________________________________________________________
 
Name:         MIKAELA CHAN             Room:          [address] [place] 
[facility]#:    [id number]     Account #:     [id number]  
Admission:    10/04/19    Attend Phys:   Karlos Coronado
Discharge:                Date of Birth: [birth year]  
Date of Service: 10/07/19 1520  Report #:      [id number]
        [id number]
_______________________________________________________________________________
The pulmonary valve is normal in structure. Mild pulmonic 
regurgitation.
 
Great Vessels
The aortic root is normal in size. IVC is normal in size and 
collapses >50% with inspiration.
 
Pericardium
There is no pericardial effusion.
 
<Conclusion>
The left ventricle is normal size.
There is normal left ventricular wall thickness.
Left ventricular systolic function is normal.
The left ventricular ejection fraction is within the normal 
range.
LVEF is 60%.
This study is not technically sufficient to allow evaluation of the 
LV diastolic function due to atrial fibrillation.
The right ventricle is normal size.
Left atrium is moderately dilated.
Right atrium is mildly dilated.
Mild aortic valve sclerosis.
Mild aortic regurgitation.
There is no aortic valvular stenosis.
The mitral valve is normal in structure.
Mild to moderate mitral regurgitation.
The tricuspid valve is normal in structure.
Moderate tricuspid regurgitation.
The RVSP is __52_____ mmHg.
IVC is normal in size and collapses >50% with inspiration.
There is no pericardial effusion.
There is normal LV segmental wall motion.
 
 
 
 
 
 
 
 
 
 
 
  <ELECTRONICALLY SIGNED>
                                           By: John Sorto MD, FACC     
  10/07/19     1520
D: 10/07/19 1520   _____________________________________
T: 10/07/19 1520   John Sorto MD, FACC       /INF

## 2019-10-07 NOTE — EKG
[place] [address]
Phone:  [phone]                     ELECTROCARDIOGRAM REPORT      
_______________________________________________________________________________
 
Name:       MIKAELA CHAN              Room:           [address]    ADM [place]  
M.R.#:  [id number]      Account #:      [id number]  
Admission:  10/04/19     Attend Phys:    JOHANNY Neff
Discharge:               Date of Birth:  [birth year]  
         Report #: [id number]
    [id number]
_______________________________________________________________________________
THIS REPORT FOR:  //name//                      
 
                          [facility]
                                       
Test Date:    2019-10-05               Test Time:    10:07:43
Pat Name:     MIKAELA CHAN          Department:   
Patient ID:   [id number]            Room:         [address]
Gender:       F                        Technician:   KF
:          1934               Requested By: Yo Amaya
Order Number: [id number]    Jacob MD:   John Sorto
                                 Measurements
Intervals                              Axis          
Rate:         99                       P:            
NC:                                    QRS:          1
QRSD:         75                       T:            220
QT:           328                                    
QTc:          421                                    
                           Interpretive Statements
Atrial fibrillation
Borderline repolarization abnormality
 
Electronically Signed On 10-7-2019 16:12:28 CDT by John Sorto
[email or web address]
 
 
 
 
 
 
 
 
 
 
 
 
 
 
 
 
 
 
 
 
  <ELECTRONICALLY SIGNED>
                                           By: John Sorto MD, FACC     
  10/07/19     1612
D: 10/05/19 1007   _____________________________________
T: 10/05/19 1007   John Sorto MD, FACC       /EPI